# Patient Record
Sex: MALE | Race: WHITE | Employment: UNEMPLOYED | URBAN - METROPOLITAN AREA
[De-identification: names, ages, dates, MRNs, and addresses within clinical notes are randomized per-mention and may not be internally consistent; named-entity substitution may affect disease eponyms.]

---

## 2024-03-05 ENCOUNTER — PREP FOR PROCEDURE (OUTPATIENT)
Dept: SURGERY | Age: 44
End: 2024-03-05

## 2024-03-07 RX ORDER — SODIUM CHLORIDE 9 MG/ML
INJECTION, SOLUTION INTRAVENOUS PRN
Status: CANCELLED | OUTPATIENT
Start: 2024-03-07

## 2024-03-07 RX ORDER — SODIUM CHLORIDE 0.9 % (FLUSH) 0.9 %
5-40 SYRINGE (ML) INJECTION PRN
Status: CANCELLED | OUTPATIENT
Start: 2024-03-07

## 2024-03-07 RX ORDER — SODIUM CHLORIDE 0.9 % (FLUSH) 0.9 %
5-40 SYRINGE (ML) INJECTION EVERY 12 HOURS SCHEDULED
Status: CANCELLED | OUTPATIENT
Start: 2024-03-07

## 2024-03-19 NOTE — PROGRESS NOTES
PLEASE CONTINUE TAKING ALL PRESCRIPTION MEDICATIONS UP TO THE DAY OF SURGERY UNLESS OTHERWISE DIRECTED BELOW. You may take Tylenol, allergy,  and/or indigestion medications.     TAKE ONLY THESE MEDICATIONS ON THE DAY OF SURGERY    FLOMAX, DOXCYCLINE           DISCONTINUE all vitamins and supplements 7 days prior to surgery. DISCONTINUE Non-Steroidal Anti-Inflammatory (NSAIDS) such as Advil and Aleve 5 days prior to surgery.     Home Medications to Hold- please continue all other medications except these.    CIALIS--    MARIJUANA -- LAST USED 3/18/24     Comments      On the day before surgery please take 2 Tylenol in the morning and then again before bed. You may use either regular or extra strength.           Please do not bring home medications with you on the day of surgery unless otherwise directed by your nurse.  If you are instructed to bring home medications, please give them to your nurse as they will be administered by the nursing staff.    If you have any questions, please call [facility] [phone].    A copy of this note was provided to the patient for reference.

## 2024-03-19 NOTE — PROGRESS NOTES
Patient verified name and     Order for consent WAS NOT found in EHR and matches case posting; patient verified.     Type 1B surgery, PHONE assessment complete.    Labs per surgeon: NONE  Labs per anesthesia protocol: NONE  EKG: NONE      Hospital approved surgical skin cleanser and instructions given per hospital policy.    Patient provided with and instructed on educational handouts including Guide to Surgery, Pain Management, Hand Hygiene, Blood Transfusion Education, and [facility] Brochure.    Patient answered medical/surgical history questions at their best of ability. All prior to admission medications documented in [facility]. Original medication prescription bottle WAS NOT visualized during patient appointment.     Patient instructed to hold all vitamins 7 days prior to surgery and NSAIDS 5 days prior to surgery, patient verbalized understanding.     Patient teach back successful and patient demonstrates knowledge of instructions.

## 2024-03-24 ENCOUNTER — ANESTHESIA EVENT (OUTPATIENT)
Dept: SURGERY | Age: 44
End: 2024-03-24
Payer: MEDICAID

## 2024-03-25 ENCOUNTER — ANESTHESIA (OUTPATIENT)
Dept: SURGERY | Age: 44
End: 2024-03-25
Payer: MEDICAID

## 2024-03-25 ENCOUNTER — HOSPITAL ENCOUNTER (OUTPATIENT)
Age: 44
Setting detail: OUTPATIENT SURGERY
Discharge: HOME | End: 2024-03-25
Attending: SURGERY | Admitting: SURGERY
Payer: MEDICAID

## 2024-03-25 VITALS
DIASTOLIC BLOOD PRESSURE: 67 MMHG | HEIGHT: 70 IN | OXYGEN SATURATION: 95 % | SYSTOLIC BLOOD PRESSURE: 112 MMHG | BODY MASS INDEX: 42.37 KG/M2 | RESPIRATION RATE: 14 BRPM | TEMPERATURE: 97.7 F | HEART RATE: 67 BPM | WEIGHT: 296 LBS

## 2024-03-25 DIAGNOSIS — K64.4 SKIN TAGS, ANUS OR RECTUM: Primary | ICD-10-CM

## 2024-03-25 PROCEDURE — 2709999900 HC NON-CHARGEABLE SUPPLY: Performed by: SURGERY

## 2024-03-25 PROCEDURE — 6370000000 HC RX 637 (ALT 250 FOR IP): Performed by: ANESTHESIOLOGY

## 2024-03-25 PROCEDURE — 2580000003 HC RX 258: Performed by: ANESTHESIOLOGY

## 2024-03-25 PROCEDURE — 6360000002 HC RX W HCPCS: Performed by: SURGERY

## 2024-03-25 PROCEDURE — 6360000002 HC RX W HCPCS: Performed by: REGISTERED NURSE

## 2024-03-25 PROCEDURE — 2500000003 HC RX 250 WO HCPCS: Performed by: REGISTERED NURSE

## 2024-03-25 PROCEDURE — 3700000001 HC ADD 15 MINUTES (ANESTHESIA): Performed by: SURGERY

## 2024-03-25 PROCEDURE — 7100000001 HC PACU RECOVERY - ADDTL 15 MIN: Performed by: SURGERY

## 2024-03-25 PROCEDURE — 7100000011 HC PHASE II RECOVERY - ADDTL 15 MIN: Performed by: SURGERY

## 2024-03-25 PROCEDURE — 7100000000 HC PACU RECOVERY - FIRST 15 MIN: Performed by: SURGERY

## 2024-03-25 PROCEDURE — 3600000002 HC SURGERY LEVEL 2 BASE: Performed by: SURGERY

## 2024-03-25 PROCEDURE — 7100000010 HC PHASE II RECOVERY - FIRST 15 MIN: Performed by: SURGERY

## 2024-03-25 PROCEDURE — 3700000000 HC ANESTHESIA ATTENDED CARE: Performed by: SURGERY

## 2024-03-25 PROCEDURE — 6370000000 HC RX 637 (ALT 250 FOR IP): Performed by: SURGERY

## 2024-03-25 PROCEDURE — 3600000012 HC SURGERY LEVEL 2 ADDTL 15MIN: Performed by: SURGERY

## 2024-03-25 PROCEDURE — 2500000003 HC RX 250 WO HCPCS: Performed by: SURGERY

## 2024-03-25 RX ORDER — SODIUM CHLORIDE 0.9 % (FLUSH) 0.9 %
5-40 SYRINGE (ML) INJECTION PRN
Status: DISCONTINUED | OUTPATIENT
Start: 2024-03-25 | End: 2024-03-25 | Stop reason: HOSPADM

## 2024-03-25 RX ORDER — SODIUM CHLORIDE 0.9 % (FLUSH) 0.9 %
5-40 SYRINGE (ML) INJECTION EVERY 12 HOURS SCHEDULED
Status: DISCONTINUED | OUTPATIENT
Start: 2024-03-25 | End: 2024-03-25 | Stop reason: HOSPADM

## 2024-03-25 RX ORDER — GINSENG 100 MG
CAPSULE ORAL PRN
Status: DISCONTINUED | OUTPATIENT
Start: 2024-03-25 | End: 2024-03-25 | Stop reason: HOSPADM

## 2024-03-25 RX ORDER — DEXTROSE MONOHYDRATE 100 MG/ML
INJECTION, SOLUTION INTRAVENOUS CONTINUOUS PRN
Status: DISCONTINUED | OUTPATIENT
Start: 2024-03-25 | End: 2024-03-25 | Stop reason: HOSPADM

## 2024-03-25 RX ORDER — SODIUM CHLORIDE, SODIUM LACTATE, POTASSIUM CHLORIDE, CALCIUM CHLORIDE 600; 310; 30; 20 MG/100ML; MG/100ML; MG/100ML; MG/100ML
INJECTION, SOLUTION INTRAVENOUS CONTINUOUS
Status: DISCONTINUED | OUTPATIENT
Start: 2024-03-25 | End: 2024-03-25 | Stop reason: HOSPADM

## 2024-03-25 RX ORDER — DEXAMETHASONE SODIUM PHOSPHATE 4 MG/ML
INJECTION, SOLUTION INTRA-ARTICULAR; INTRALESIONAL; INTRAMUSCULAR; INTRAVENOUS; SOFT TISSUE PRN
Status: DISCONTINUED | OUTPATIENT
Start: 2024-03-25 | End: 2024-03-25 | Stop reason: SDUPTHER

## 2024-03-25 RX ORDER — EPHEDRINE SULFATE 5 MG/ML
INJECTION INTRAVENOUS PRN
Status: DISCONTINUED | OUTPATIENT
Start: 2024-03-25 | End: 2024-03-25 | Stop reason: SDUPTHER

## 2024-03-25 RX ORDER — SODIUM CHLORIDE 9 MG/ML
INJECTION, SOLUTION INTRAVENOUS PRN
Status: DISCONTINUED | OUTPATIENT
Start: 2024-03-25 | End: 2024-03-25 | Stop reason: HOSPADM

## 2024-03-25 RX ORDER — MIDAZOLAM HYDROCHLORIDE 2 MG/2ML
2 INJECTION, SOLUTION INTRAMUSCULAR; INTRAVENOUS
Status: DISCONTINUED | OUTPATIENT
Start: 2024-03-25 | End: 2024-03-25 | Stop reason: HOSPADM

## 2024-03-25 RX ORDER — NEOSTIGMINE METHYLSULFATE 1 MG/ML
INJECTION, SOLUTION INTRAVENOUS PRN
Status: DISCONTINUED | OUTPATIENT
Start: 2024-03-25 | End: 2024-03-25 | Stop reason: SDUPTHER

## 2024-03-25 RX ORDER — GLUCAGON 1 MG/ML
1 KIT INJECTION PRN
Status: DISCONTINUED | OUTPATIENT
Start: 2024-03-25 | End: 2024-03-25 | Stop reason: HOSPADM

## 2024-03-25 RX ORDER — ACETAMINOPHEN 500 MG
1000 TABLET ORAL ONCE
Status: COMPLETED | OUTPATIENT
Start: 2024-03-25 | End: 2024-03-25

## 2024-03-25 RX ORDER — PROCHLORPERAZINE EDISYLATE 5 MG/ML
5 INJECTION INTRAMUSCULAR; INTRAVENOUS
Status: DISCONTINUED | OUTPATIENT
Start: 2024-03-25 | End: 2024-03-25 | Stop reason: HOSPADM

## 2024-03-25 RX ORDER — HYDROMORPHONE HYDROCHLORIDE 2 MG/ML
0.5 INJECTION, SOLUTION INTRAMUSCULAR; INTRAVENOUS; SUBCUTANEOUS EVERY 10 MIN PRN
Status: DISCONTINUED | OUTPATIENT
Start: 2024-03-25 | End: 2024-03-25 | Stop reason: HOSPADM

## 2024-03-25 RX ORDER — ONDANSETRON 2 MG/ML
INJECTION INTRAMUSCULAR; INTRAVENOUS PRN
Status: DISCONTINUED | OUTPATIENT
Start: 2024-03-25 | End: 2024-03-25 | Stop reason: SDUPTHER

## 2024-03-25 RX ORDER — OXYCODONE HYDROCHLORIDE AND ACETAMINOPHEN 5; 325 MG/1; MG/1
1 TABLET ORAL EVERY 6 HOURS PRN
Qty: 20 TABLET | Refills: 0 | Status: SHIPPED | OUTPATIENT
Start: 2024-03-25 | End: 2024-03-30

## 2024-03-25 RX ORDER — KETOROLAC TROMETHAMINE 30 MG/ML
INJECTION, SOLUTION INTRAMUSCULAR; INTRAVENOUS PRN
Status: DISCONTINUED | OUTPATIENT
Start: 2024-03-25 | End: 2024-03-25 | Stop reason: SDUPTHER

## 2024-03-25 RX ORDER — SUCCINYLCHOLINE/SOD CL,ISO/PF 200MG/10ML
SYRINGE (ML) INTRAVENOUS PRN
Status: DISCONTINUED | OUTPATIENT
Start: 2024-03-25 | End: 2024-03-25 | Stop reason: SDUPTHER

## 2024-03-25 RX ORDER — OXYCODONE HYDROCHLORIDE 5 MG/1
5 TABLET ORAL
Status: DISCONTINUED | OUTPATIENT
Start: 2024-03-25 | End: 2024-03-25 | Stop reason: HOSPADM

## 2024-03-25 RX ORDER — LIDOCAINE HYDROCHLORIDE 10 MG/ML
1 INJECTION, SOLUTION INFILTRATION; PERINEURAL
Status: DISCONTINUED | OUTPATIENT
Start: 2024-03-25 | End: 2024-03-25 | Stop reason: HOSPADM

## 2024-03-25 RX ORDER — LIDOCAINE HYDROCHLORIDE 20 MG/ML
INJECTION, SOLUTION EPIDURAL; INFILTRATION; INTRACAUDAL; PERINEURAL PRN
Status: DISCONTINUED | OUTPATIENT
Start: 2024-03-25 | End: 2024-03-25 | Stop reason: SDUPTHER

## 2024-03-25 RX ORDER — ROCURONIUM BROMIDE 10 MG/ML
INJECTION, SOLUTION INTRAVENOUS PRN
Status: DISCONTINUED | OUTPATIENT
Start: 2024-03-25 | End: 2024-03-25 | Stop reason: SDUPTHER

## 2024-03-25 RX ORDER — NALOXONE HYDROCHLORIDE 0.4 MG/ML
INJECTION, SOLUTION INTRAMUSCULAR; INTRAVENOUS; SUBCUTANEOUS PRN
Status: DISCONTINUED | OUTPATIENT
Start: 2024-03-25 | End: 2024-03-25 | Stop reason: HOSPADM

## 2024-03-25 RX ORDER — GLYCOPYRROLATE 0.2 MG/ML
INJECTION INTRAMUSCULAR; INTRAVENOUS PRN
Status: DISCONTINUED | OUTPATIENT
Start: 2024-03-25 | End: 2024-03-25 | Stop reason: SDUPTHER

## 2024-03-25 RX ORDER — PROPOFOL 10 MG/ML
INJECTION, EMULSION INTRAVENOUS PRN
Status: DISCONTINUED | OUTPATIENT
Start: 2024-03-25 | End: 2024-03-25 | Stop reason: SDUPTHER

## 2024-03-25 RX ORDER — BUPIVACAINE HYDROCHLORIDE AND EPINEPHRINE 5; 5 MG/ML; UG/ML
INJECTION, SOLUTION EPIDURAL; INTRACAUDAL; PERINEURAL PRN
Status: DISCONTINUED | OUTPATIENT
Start: 2024-03-25 | End: 2024-03-25 | Stop reason: HOSPADM

## 2024-03-25 RX ORDER — DIPHENHYDRAMINE HYDROCHLORIDE 50 MG/ML
12.5 INJECTION INTRAMUSCULAR; INTRAVENOUS
Status: DISCONTINUED | OUTPATIENT
Start: 2024-03-25 | End: 2024-03-25 | Stop reason: HOSPADM

## 2024-03-25 RX ORDER — FENTANYL CITRATE 50 UG/ML
100 INJECTION, SOLUTION INTRAMUSCULAR; INTRAVENOUS
Status: DISCONTINUED | OUTPATIENT
Start: 2024-03-25 | End: 2024-03-25 | Stop reason: HOSPADM

## 2024-03-25 RX ADMIN — GLYCOPYRROLATE 0.8 MG: 0.2 INJECTION INTRAMUSCULAR; INTRAVENOUS at 08:09

## 2024-03-25 RX ADMIN — SODIUM CHLORIDE, SODIUM LACTATE, POTASSIUM CHLORIDE, AND CALCIUM CHLORIDE: 600; 310; 30; 20 INJECTION, SOLUTION INTRAVENOUS at 07:01

## 2024-03-25 RX ADMIN — FENTANYL CITRATE 100 MCG: 50 INJECTION INTRAMUSCULAR; INTRAVENOUS at 07:14

## 2024-03-25 RX ADMIN — Medication 200 MG: at 07:14

## 2024-03-25 RX ADMIN — ACETAMINOPHEN 1000 MG: 500 TABLET, FILM COATED ORAL at 07:00

## 2024-03-25 RX ADMIN — Medication 3000 MG: at 07:29

## 2024-03-25 RX ADMIN — PROPOFOL 300 MG: 10 INJECTION, EMULSION INTRAVENOUS at 07:14

## 2024-03-25 RX ADMIN — ROCURONIUM BROMIDE 30 MG: 10 INJECTION, SOLUTION INTRAVENOUS at 07:42

## 2024-03-25 RX ADMIN — LIDOCAINE HYDROCHLORIDE 100 MG: 20 INJECTION, SOLUTION EPIDURAL; INFILTRATION; INTRACAUDAL; PERINEURAL at 07:14

## 2024-03-25 RX ADMIN — PROPOFOL 100 MG: 10 INJECTION, EMULSION INTRAVENOUS at 07:41

## 2024-03-25 RX ADMIN — KETOROLAC TROMETHAMINE 30 MG: 30 INJECTION, SOLUTION INTRAMUSCULAR at 08:00

## 2024-03-25 RX ADMIN — SODIUM CHLORIDE, SODIUM LACTATE, POTASSIUM CHLORIDE, AND CALCIUM CHLORIDE: 600; 310; 30; 20 INJECTION, SOLUTION INTRAVENOUS at 08:09

## 2024-03-25 RX ADMIN — ONDANSETRON 4 MG: 2 INJECTION INTRAMUSCULAR; INTRAVENOUS at 07:49

## 2024-03-25 RX ADMIN — DEXAMETHASONE SODIUM PHOSPHATE 4 MG: 4 INJECTION INTRA-ARTICULAR; INTRALESIONAL; INTRAMUSCULAR; INTRAVENOUS; SOFT TISSUE at 07:29

## 2024-03-25 RX ADMIN — Medication 5 MG: at 08:09

## 2024-03-25 RX ADMIN — FENTANYL CITRATE 50 MCG: 50 INJECTION INTRAMUSCULAR; INTRAVENOUS at 07:42

## 2024-03-25 RX ADMIN — EPHEDRINE SULFATE 10 MG: 5 INJECTION INTRAVENOUS at 07:33

## 2024-03-25 ASSESSMENT — PAIN - FUNCTIONAL ASSESSMENT
PAIN_FUNCTIONAL_ASSESSMENT: NONE - DENIES PAIN
PAIN_FUNCTIONAL_ASSESSMENT: 0-10

## 2024-03-25 ASSESSMENT — LIFESTYLE VARIABLES: SMOKING_STATUS: 1

## 2024-03-25 NOTE — OP NOTE
Operative Note      Patient: Ricardo Rooney III  YOB: 1980  MRN: [id number]    Date of Procedure: 3/25/2024    Pre-Op Diagnosis Codes:     * Skin tags, anus or rectum [K64.4]    Post-Op Diagnosis: Same       Procedure(s):  EXCISION OF ANAL TAGS    Surgeon(s):  Santana Valdovinos Jr., MD    Assistant:   Surgical Assistant: Adriana Maria    Anesthesia: General    Estimated Blood Loss (mL): Minimal    Complications: None    Specimens:   * No specimens in log *    Implants:  * No implants in log *      Drains: * No LDAs found *    Findings: 4 anal tag    This procedure was not performed to treat primary cutaneous melanoma through wide local excision      Detailed Description of Procedure:   Dictated   jOB#[id number]    Electronically signed by SANTANA VALDOVINOS JR, MD on 3/25/2024 at 8:08 AM

## 2024-03-25 NOTE — OP NOTE
[address]  [address]                            OPERATIVE REPORT      PATIENT NAME: ALBERTO HERNANDEZ      : 1980  MED REC NO: [id number]                       ROOM: [facility]  ACCOUNT NO: [id number]                       ADMIT DATE: 2024  PROVIDER: Santana Valdovinos Jr, MD    DATE OF SERVICE:  2024    PREOPERATIVE DIAGNOSES:  Anal tags.    POSTOPERATIVE DIAGNOSES:  Anal tags.    PROCEDURES PERFORMED:  Excision of anal tags.    SURGEON:  Santana Valdovinos Jr, MD    ASSISTANT:  None.    ANESTHESIA:  General.    ESTIMATED BLOOD LOSS:  Minimal.    SPECIMENS REMOVED:  Four anal tags.         COMPLICATIONS:  None.    IMPLANTS:  none        DESCRIPTION OF PROCEDURE:  After the patient was brought into the room, he was placed under general anesthesia.  He was then placed in the prone position.  The anus and buttocks were prepped and draped in sterile fashion.  The patient had 4 relatively large groups of anal tags.  These were cross-clamped with hemostats.  After leaving this for several minutes, the hemostats were removed one at a time and using the Harmonic, these were excised.  Each one of these areas was oversewn with a running 2-0 Vicryl.  All of these were sent to Pathology.  Minimal-to-no bleeding.  Some bacitracin ointment was then placed on the area.  The patient tolerated the procedure well.    COUNTS:  Correct.        SANTANA VALDOVINOS JR, MD      TCM/AQS  D:  2024 08:10:56  T:  2024 09:41:58  JOB #:  [id number]

## 2024-03-25 NOTE — ANESTHESIA POSTPROCEDURE EVALUATION
Department of Anesthesiology  Postprocedure Note    Patient: Ricardo Rooney III  MRN: [id number]  YOB: 1980  Date of evaluation: 3/25/2024    Procedure Summary       Date: 03/25/24 Room / Location: [facility] MAIN OR 03 / [facility] MAIN OR    Anesthesia Start: 0705 Anesthesia Stop: 0821    Procedure: EXCISION OF ANAL TAGS (Anus) Diagnosis:       Skin tags, anus or rectum      (Skin tags, anus or rectum [K64.4])    Providers: Santana Bianchi Jr., MD Responsible Provider: Heber Giles IV, MD    Anesthesia Type: general ASA Status: 3            Anesthesia Type: No value filed.    Clay Phase I: Clay Score: 10    Clay Phase II: Clay Score: 10    Anesthesia Post Evaluation    Patient location during evaluation: PACU  Patient participation: complete - patient participated  Level of consciousness: awake  Airway patency: patent  Nausea & Vomiting: no nausea and no vomiting  Cardiovascular status: hemodynamically stable  Respiratory status: acceptable, nonlabored ventilation and spontaneous ventilation  Hydration status: euvolemic  Comments: /67   Pulse 67   Temp 97.7 °F (36.5 °C) (Skin)   Resp 14   Ht 1.778 m (5' 10\")   Wt 134.3 kg (296 lb)   SpO2 95%   BMI 42.47 kg/m²     Multimodal analgesia pain management approach  Pain management: adequate and satisfactory to patient        No notable events documented.

## 2024-03-25 NOTE — BRIEF OP NOTE
Brief Postoperative Note      Patient: Ricardo Rooney III  YOB: 1980  MRN: [id number]    Date of Procedure: 3/25/2024    Pre-Op Diagnosis Codes:     * Skin tags, anus or rectum [K64.4]    Post-Op Diagnosis: Same       Procedure(s):  EXCISION OF ANAL TAGS    Surgeon(s):  Santana Valdovinos Jr., MD    Assistant:  Surgical Assistant: Adriana Maria    Anesthesia: General    Estimated Blood Loss (mL): Minimal    Complications: None    Specimens:   * No specimens in log *    Implants:  * No implants in log *      Drains: * No LDAs found *    Findings: 4 anal tags  This procedure was not performed to treat primary cutaneous melanoma through wide local excision      Electronically signed by SANTANA VALDOVINOS JR, MD on 3/25/2024 at 8:08 AM

## 2024-03-25 NOTE — DISCHARGE INSTRUCTIONS
Daily shower with antibacterial soap. Pat dry.  Wash perineal area after each bowel movement  Do not get constipated, use stool softeners and increase fiber in diet.    After general anesthesia or intravenous sedation, for 24 hours or while taking prescription Narcotics:  Limit your activities  Some people will feel drowsy or dizzy for up to a few hours after waking up.  A responsible adult needs to be with you for the next 24 hours  Do not drive and operate hazardous machinery  Do not make important personal or business decisions  Do not drink alcoholic beverages  If you have not urinated within 8 hours after discharge, and you are experiencing discomfort from urinary retention, please go to the nearest ED.  If you have sleep apnea and have a CPAP machine, please use it for all naps and sleeping.  Please use caution when taking narcotics and any of your home medications that may cause drowsiness.  *  Please give a list of your current medications to your Primary Care Provider.  *  Please update this list whenever your medications are discontinued, doses are      changed, or new medications (including over-the-counter products) are added.  *  Please carry medication information at all times in case of emergency situations.    These are general instructions for a healthy lifestyle:  No smoking/ No tobacco products/ Avoid exposure to second hand smoke  Surgeon General's Warning:  Quitting smoking now greatly reduces serious risk to your health.  Obesity, smoking, and sedentary lifestyle greatly increases your risk for illness  A healthy diet, regular physical exercise & weight monitoring are important for maintaining a healthy lifestyle    You may be retaining fluid if you have a history of heart failure or if you experience any of the following symptoms:  Weight gain of 3 pounds or more overnight or 5 pounds in a week, increased swelling in our hands or feet or shortness of breath while lying flat in bed.  Please

## 2024-03-25 NOTE — ANESTHESIA PRE PROCEDURE
Department of Anesthesiology  Preprocedure Note       Name:  Ricardo Rooney III   Age:  44 y.o.  :  1980                                          MRN:  [id number]         Date:  3/25/2024      Surgeon: Surgeon(s):  Santana Bianchi Jr., MD    Procedure: Procedure(s):  EXCISION OF ANAL TAGS    Medications prior to admission:   Prior to Admission medications    Medication Sig Start Date End Date Taking? Authorizing Provider   doxycycline monohydrate (MONODOX) 50 MG capsule TAKE 1 CAPSULE BY MOUTH DAILY WITH MEALS 24   ProviderRaulito MD   tamsulosin (FLOMAX) 0.4 MG capsule Take 1 capsule by mouth daily    Raulito Walker MD   tadalafil (CIALIS) 20 MG tablet Take 1 tablet by mouth as needed for Erectile Dysfunction    ProviderRaulito MD       Current medications:    Current Facility-Administered Medications   Medication Dose Route Frequency Provider Last Rate Last Admin   • lidocaine 1 % injection 1 mL  1 mL IntraDERmal Once PRN Anselmo Arevalo MD       • acetaminophen (TYLENOL) tablet 1,000 mg  1,000 mg Oral Once Anselmo Arevalo MD       • fentaNYL (SUBLIMAZE) injection 100 mcg  100 mcg IntraVENous Once PRN Anselmo Arevalo MD       • lactated ringers IV soln infusion   IntraVENous Continuous Anselmo Arevalo MD       • sodium chloride flush 0.9 % injection 5-40 mL  5-40 mL IntraVENous 2 times per day Anselmo Arevalo MD       • sodium chloride flush 0.9 % injection 5-40 mL  5-40 mL IntraVENous PRN Anselmo Arevalo MD       • 0.9 % sodium chloride infusion   IntraVENous PRN Anselmo Arevalo MD       • midazolam PF (VERSED) injection 2 mg  2 mg IntraVENous Once PRN Anselmo Arevalo MD       • ceFAZolin (ANCEF) 3000 mg in sterile water 30 mL IV syringe  3,000 mg IntraVENous Once Santana Bianchi Jr., MD       • sodium chloride flush 0.9 % injection 5-40 mL  5-40 mL IntraVENous 2 times per day Santana Bianchi Jr., MD       • sodium chloride flush 0.9 % injection 5-40 mL  5-40 mL IntraVENous PRN Arias

## 2024-04-02 ENCOUNTER — OFFICE VISIT (OUTPATIENT)
Dept: SURGERY | Age: 44
End: 2024-04-02

## 2024-04-02 VITALS — BODY MASS INDEX: 42.37 KG/M2 | HEIGHT: 70 IN | WEIGHT: 296 LBS

## 2024-04-02 DIAGNOSIS — Z09 POSTOPERATIVE EXAMINATION: ICD-10-CM

## 2024-04-02 DIAGNOSIS — K64.4 SKIN TAGS, ANUS OR RECTUM: Primary | ICD-10-CM

## 2024-04-02 PROCEDURE — 99024 POSTOP FOLLOW-UP VISIT: CPT

## 2024-04-02 NOTE — PROGRESS NOTES
[address]   [facility] [id number]  [phone]        poDate: 2024      Name: Ricardo Rooney III      MRN: [id number]       : 1980       Age: 44 y.o.    Sex: male        Unknown, Provider, APRN - NP       CC:    Chief Complaint   Patient presents with    Post-Op Check       HPI:  The patient presents for a post-op visit s/p  Excision of anal tags. Santana Bianchi Jr, MD 3/25/24    Physical Exam:     Ht 1.778 m (5' 10\")   Wt 134.3 kg (296 lb)   BMI 42.47 kg/m²     General: Alert, oriented, cooperative and in no acute distress.     Neck: Supple, trachea midline, no appreciable thyromegaly  Resp: No JVD.  Breathing is  non-labored. Lungs clear to auscultation without wheezing or rhonchi   CV: RRR. No murmurs, rubs or gallops appreciated.  Abd: soft non-tender and non-distended without peritoneal signs. +bs    Skin/incision:  anal tissue is intact with scant serous drainage. No signs of infection. No erythema.    Assessment/Plan:  Ricardo Rooney III is a 44 y.o. male who is s/p Excision of anal tags. Santana Bianchi Jr, MD 3/25/24.    1. Follow-up as needed    2. Lift nothing heavier than 25 pounds for 4 weeks after surgery. 4 weeks after Surgery, return to full activity    3. Regular diet  4. Do not get constipated. No more than 1 day without a bm. If 1 day no bm, then take colace stool softener twice a day. If 24 hours of taking colace stool softener does not produce a bm , then keep taking colace and add miralax laxative twice a day until you have a bm. Once you are having regular bms, you can use colace and/or miralax as needed to ensure you have a regular daily bm.   5. Gently wash the incision with antibacterial soap and pat dry at least once daily and after bowel movements.    Signed: SKIP Foster - LORENA    2024  2:54 PM

## 2024-04-02 NOTE — PATIENT INSTRUCTIONS
1. Follow-up as needed    2. Lift nothing heavier than 25 pounds for 4 weeks after surgery. 4 weeks after Surgery, return to full activity    3. Regular diet  4. Do not get constipated. No more than 1 day without a bm. If 1 day no bm, then take colace stool softener twice a day. If 24 hours of taking colace stool softener does not produce a bm , then keep taking colace and add miralax laxative twice a day until you have a bm. Once you are having regular bms, you can use colace and/or miralax as needed to ensure you have a regular daily bm.   5. Gently wash the incision with antibacterial soap and pat dry at least once daily and after bowel movements.

## 2024-04-16 ENCOUNTER — OFFICE VISIT (OUTPATIENT)
Dept: SURGERY | Age: 44
End: 2024-04-16

## 2024-04-16 VITALS — BODY MASS INDEX: 42.37 KG/M2 | HEIGHT: 70 IN | WEIGHT: 296 LBS

## 2024-04-16 DIAGNOSIS — Z09 POSTOPERATIVE EXAMINATION: Primary | ICD-10-CM

## 2024-04-16 PROCEDURE — 99024 POSTOP FOLLOW-UP VISIT: CPT

## 2024-04-16 NOTE — PATIENT INSTRUCTIONS
1. Follow-up as needed    2.         Lift nothing heavier than 25 pounds for 4 weeks after surgery. 4 weeks after Surgery, return to full activity     3.         Regular diet  4.         Do not get constipated. No more than 1 day without a bm. If 1 day no bm, then take colace stool softener twice a day. If 24 hours of taking colace stool softener does not produce a bm , then keep taking colace and add miralax laxative twice a day until you have a bm. Once you are having regular bms, you can use colace and/or miralax as needed to ensure you have a regular daily bm.   5.         Gently wash the incision with antibacterial soap and pat dry at least once daily and after bowel movements.  6. Follow up with Dr. Bianchi 5/2/24

## 2024-04-16 NOTE — PROGRESS NOTES
after bowel movements.  6. Follow up with Dr. Bianchi 5/2/24    Signed: SKIP Foster NP    4/16/2024  4:02 PM

## 2024-05-02 ENCOUNTER — OFFICE VISIT (OUTPATIENT)
Dept: SURGERY | Age: 44
End: 2024-05-02
Payer: MEDICAID

## 2024-05-02 VITALS
BODY MASS INDEX: 42.37 KG/M2 | WEIGHT: 296 LBS | DIASTOLIC BLOOD PRESSURE: 78 MMHG | HEIGHT: 70 IN | HEART RATE: 66 BPM | SYSTOLIC BLOOD PRESSURE: 121 MMHG

## 2024-05-02 DIAGNOSIS — R19.00 FULLNESS OF INGUINAL REGION: Primary | ICD-10-CM

## 2024-05-02 PROCEDURE — 99213 OFFICE O/P EST LOW 20 MIN: CPT | Performed by: SURGERY

## 2024-05-02 NOTE — PROGRESS NOTES
[facility]  [address]  [place], [place] [address] [phone]    Office Note/H&P/Consult Note   Ricardo Rooney III   MRN: [id number]     : 1980        HPI: Ricardo Rooney III is a 44 y.o. male who is here to see me today with the possibility that he has bilateral inguinal hernias.  He had an open right inguinal hernia repair on 2015 at [facility] with a pro light plug and mesh.  He is feeling some discomfort on both groin areas and would like to be checked.        Past Medical History:   Diagnosis Date    Anxiety and depression     hx-- no meds at present    Back pain     Leg pain     from sciatica    Morbid obesity (HCC)     BMI = 42    Plantar fasciitis     Sciatica     bilat    Skin tags, anus or rectum 2024    Excision of anal tags. Santana Bianchi Jr, MD 3/25/24     Past Surgical History:   Procedure Laterality Date    ANUS SURGERY N/A 3/25/2024    EXCISION OF ANAL TAGS performed by Santana Bianchi Jr., MD at [facility] MAIN OR    HERNIA REPAIR Right     ORTHOPEDIC SURGERY Left     Compound fracture left elbow; hardware in place     Current Outpatient Medications   Medication Sig    doxycycline monohydrate (MONODOX) 50 MG capsule TAKE 1 CAPSULE BY MOUTH DAILY WITH MEALS    tamsulosin (FLOMAX) 0.4 MG capsule Take 1 capsule by mouth daily    tadalafil (CIALIS) 20 MG tablet Take 1 tablet by mouth as needed for Erectile Dysfunction     No current facility-administered medications for this visit.     ALLERGIES:  Latex, Meperidine, and Penicillins    Social History     Socioeconomic History    Marital status: Single     Spouse name: None    Number of children: None    Years of education: None    Highest education level: None   Tobacco Use    Smoking status: Every Day     Current packs/day: 0.50     Types: Cigarettes    Smokeless tobacco: Never    Tobacco comments:     Smokes 0.5 ppd x 15 yrs   Vaping Use    Vaping Use: Never used   Substance and

## 2024-05-30 ENCOUNTER — OFFICE VISIT (OUTPATIENT)
Dept: UROLOGY | Age: 44
End: 2024-05-30
Payer: MEDICAID

## 2024-05-30 DIAGNOSIS — N13.8 BPH WITH OBSTRUCTION/LOWER URINARY TRACT SYMPTOMS: ICD-10-CM

## 2024-05-30 DIAGNOSIS — N40.1 BPH WITH OBSTRUCTION/LOWER URINARY TRACT SYMPTOMS: ICD-10-CM

## 2024-05-30 DIAGNOSIS — N39.43 POST-VOID DRIBBLING: Primary | ICD-10-CM

## 2024-05-30 LAB
BILIRUBIN, URINE, POC: NEGATIVE
BLOOD URINE, POC: NEGATIVE
GLUCOSE URINE, POC: NEGATIVE
KETONES, URINE, POC: NEGATIVE
LEUKOCYTE ESTERASE, URINE, POC: NEGATIVE
NITRITE, URINE, POC: NEGATIVE
PH, URINE, POC: 6 (ref 4.6–8)
PROTEIN,URINE, POC: NEGATIVE
SPECIFIC GRAVITY, URINE, POC: 1.01 (ref 1–1.03)
URINALYSIS CLARITY, POC: NORMAL
URINALYSIS COLOR, POC: NORMAL
UROBILINOGEN, POC: NORMAL

## 2024-05-30 PROCEDURE — 81003 URINALYSIS AUTO W/O SCOPE: CPT | Performed by: UROLOGY

## 2024-05-30 PROCEDURE — 99204 OFFICE O/P NEW MOD 45 MIN: CPT | Performed by: UROLOGY

## 2024-05-30 ASSESSMENT — ENCOUNTER SYMPTOMS
INDIGESTION: 0
NAUSEA: 0
SKIN LESIONS: 0
DIARRHEA: 0
EYE DISCHARGE: 0
EYE PAIN: 0
WHEEZING: 0
COUGH: 0
SHORTNESS OF BREATH: 0
BACK PAIN: 0
VOMITING: 0
HEARTBURN: 0
CONSTIPATION: 0
ABDOMINAL PAIN: 0
BLOOD IN STOOL: 0

## 2024-05-30 NOTE — PROGRESS NOTES
file   Occupational History    Not on file   Tobacco Use    Smoking status: Every Day     Current packs/day: 0.50     Average packs/day: 0.5 packs/day for 15.0 years (7.5 ttl pk-yrs)     Types: Cigarettes    Smokeless tobacco: Never    Tobacco comments:     Smokes 0.5 ppd x 15 yrs   Vaping Use    Vaping Use: Never used   Substance and Sexual Activity    Alcohol use: Yes     Comment: very rarely    Drug use: Not Currently     Types: Marijuana (Weed)     Comment: smokes daily-- none 3/18/24    Sexual activity: Yes     Partners: Male   Other Topics Concern    Not on file   Social History Narrative    Not on file     Social Determinants of Health     Financial Resource Strain: Not on file   Food Insecurity: Not on file   Transportation Needs: Not on file   Physical Activity: Not on file   Stress: Not on file   Social Connections: Not on file   Intimate Partner Violence: Not on file   Housing Stability: Not on file     Family History   Problem Relation Age of Onset    Lung Cancer Maternal Grandfather         smoker    Atrial Fibrillation Father     Other Maternal Grandmother         multiple myeloma    Kidney Disease Maternal Grandmother     Kidney Disease Father     Diabetes Paternal Grandmother     Stroke Paternal Grandmother     Heart Attack Paternal Grandfather     Colon Cancer Paternal Grandfather        Review of Systems  Constitutional:   Negative for fever, chills, appetite change, malaise/fatigue, headaches and weight loss.  Skin:  Negative for skin lesions, rash and itching.  Eyes:  Negative for visual disturbance, eye pain and eye discharge.  ENT:  Negative for difficulty articulating words, pain swallowing, high frequency hearing loss and dry mouth.  Respiratory:  Negative for cough, blood in sputum, shortness of breath and wheezing.  Cardiovascular:  Negative for chest pain, hypertension, irregular heartbeat, leg pain, leg swelling, regular rate and rhythm and varicose veins.  GI:  Negative for nausea,

## 2025-02-20 SDOH — HEALTH STABILITY: PHYSICAL HEALTH: ON AVERAGE, HOW MANY DAYS PER WEEK DO YOU ENGAGE IN MODERATE TO STRENUOUS EXERCISE (LIKE A BRISK WALK)?: 1 DAY

## 2025-02-20 SDOH — HEALTH STABILITY: PHYSICAL HEALTH: ON AVERAGE, HOW MANY MINUTES DO YOU ENGAGE IN EXERCISE AT THIS LEVEL?: 10 MIN

## 2025-02-21 ENCOUNTER — OFFICE VISIT (OUTPATIENT)
Dept: PRIMARY CARE CLINIC | Facility: CLINIC | Age: 45
End: 2025-02-21

## 2025-02-21 VITALS
SYSTOLIC BLOOD PRESSURE: 120 MMHG | DIASTOLIC BLOOD PRESSURE: 72 MMHG | WEIGHT: 300.6 LBS | HEIGHT: 70 IN | HEART RATE: 65 BPM | TEMPERATURE: 98.4 F | BODY MASS INDEX: 43.03 KG/M2 | OXYGEN SATURATION: 98 % | RESPIRATION RATE: 16 BRPM

## 2025-02-21 DIAGNOSIS — E78.2 MIXED HYPERLIPIDEMIA: ICD-10-CM

## 2025-02-21 DIAGNOSIS — F33.40 RECURRENT MAJOR DEPRESSIVE DISORDER, IN REMISSION: ICD-10-CM

## 2025-02-21 DIAGNOSIS — M51.27 HERNIATED NUCLEUS PULPOSUS, L5-S1, LEFT: ICD-10-CM

## 2025-02-21 DIAGNOSIS — Z13.1 SCREENING FOR DIABETES MELLITUS (DM): ICD-10-CM

## 2025-02-21 DIAGNOSIS — E34.9 TESTOSTERONE INSUFFICIENCY: ICD-10-CM

## 2025-02-21 DIAGNOSIS — Z12.11 SCREEN FOR COLON CANCER: ICD-10-CM

## 2025-02-21 DIAGNOSIS — Z11.59 ENCOUNTER FOR SCREENING FOR VIRAL DISEASE: ICD-10-CM

## 2025-02-21 DIAGNOSIS — R39.14 BENIGN PROSTATIC HYPERPLASIA WITH INCOMPLETE BLADDER EMPTYING: ICD-10-CM

## 2025-02-21 DIAGNOSIS — M51.26 HERNIATED NUCLEUS PULPOSUS, L4-5 RIGHT: ICD-10-CM

## 2025-02-21 DIAGNOSIS — M25.511 ACUTE PAIN OF RIGHT SHOULDER: ICD-10-CM

## 2025-02-21 DIAGNOSIS — Z00.00 ANNUAL PHYSICAL EXAM: Primary | ICD-10-CM

## 2025-02-21 DIAGNOSIS — N40.1 BENIGN PROSTATIC HYPERPLASIA WITH INCOMPLETE BLADDER EMPTYING: ICD-10-CM

## 2025-02-21 DIAGNOSIS — Z23 NEED FOR TDAP VACCINATION: ICD-10-CM

## 2025-02-21 DIAGNOSIS — Z13.21 ENCOUNTER FOR VITAMIN DEFICIENCY SCREENING: ICD-10-CM

## 2025-02-21 PROBLEM — F32.A ANXIETY AND DEPRESSION: Status: RESOLVED | Noted: 2020-02-27 | Resolved: 2025-02-21

## 2025-02-21 PROBLEM — F32.5 MAJOR DEPRESSIVE DISORDER IN REMISSION: Status: ACTIVE | Noted: 2024-03-28

## 2025-02-21 PROBLEM — J30.1 SEASONAL ALLERGIC RHINITIS DUE TO POLLEN: Status: ACTIVE | Noted: 2020-02-27

## 2025-02-21 PROBLEM — E66.01 SEVERE OBESITY: Status: RESOLVED | Noted: 2020-02-27 | Resolved: 2025-02-21

## 2025-02-21 PROBLEM — F41.9 ANXIETY AND DEPRESSION: Status: RESOLVED | Noted: 2020-02-27 | Resolved: 2025-02-21

## 2025-02-21 PROBLEM — R19.00: Status: RESOLVED | Noted: 2024-05-02 | Resolved: 2025-02-21

## 2025-02-21 PROBLEM — K40.90 INGUINAL HERNIA: Status: ACTIVE | Noted: 2024-04-23

## 2025-02-21 LAB
25(OH)D3 SERPL-MCNC: 16 NG/ML (ref 30–100)
ALBUMIN SERPL-MCNC: 3.7 G/DL (ref 3.5–5)
ALBUMIN/GLOB SERPL: 1.3 (ref 1–1.9)
ALP SERPL-CCNC: 135 U/L (ref 40–129)
ALT SERPL-CCNC: 22 U/L (ref 8–55)
ANION GAP SERPL CALC-SCNC: 9 MMOL/L (ref 7–16)
AST SERPL-CCNC: 16 U/L (ref 15–37)
BASOPHILS # BLD: 0.04 K/UL (ref 0–0.2)
BASOPHILS NFR BLD: 0.5 % (ref 0–2)
BILIRUB SERPL-MCNC: 0.2 MG/DL (ref 0–1.2)
BUN SERPL-MCNC: 10 MG/DL (ref 6–23)
CALCIUM SERPL-MCNC: 9.8 MG/DL (ref 8.8–10.2)
CHLORIDE SERPL-SCNC: 107 MMOL/L (ref 98–107)
CHOLEST SERPL-MCNC: 159 MG/DL (ref 0–200)
CO2 SERPL-SCNC: 26 MMOL/L (ref 20–29)
CREAT SERPL-MCNC: 0.94 MG/DL (ref 0.8–1.3)
DIFFERENTIAL METHOD BLD: NORMAL
EOSINOPHIL # BLD: 0.25 K/UL (ref 0–0.8)
EOSINOPHIL NFR BLD: 3 % (ref 0.5–7.8)
ERYTHROCYTE [DISTWIDTH] IN BLOOD BY AUTOMATED COUNT: 12.5 % (ref 11.9–14.6)
EST. AVERAGE GLUCOSE BLD GHB EST-MCNC: 113 MG/DL
GLOBULIN SER CALC-MCNC: 2.9 G/DL (ref 2.3–3.5)
GLUCOSE SERPL-MCNC: 98 MG/DL (ref 70–99)
HBA1C MFR BLD: 5.6 % (ref 0–5.6)
HBV SURFACE AB SERPL IA-ACNC: 497 MIU/ML
HCT VFR BLD AUTO: 47.2 % (ref 41.1–50.3)
HDLC SERPL-MCNC: 41 MG/DL (ref 40–60)
HDLC SERPL: 3.9 (ref 0–5)
HGB BLD-MCNC: 16.3 G/DL (ref 13.6–17.2)
IMM GRANULOCYTES # BLD AUTO: 0.01 K/UL (ref 0–0.5)
IMM GRANULOCYTES NFR BLD AUTO: 0.1 % (ref 0–5)
LDLC SERPL CALC-MCNC: 97 MG/DL (ref 0–100)
LYMPHOCYTES # BLD: 3.23 K/UL (ref 0.5–4.6)
LYMPHOCYTES NFR BLD: 38.2 % (ref 13–44)
MAGNESIUM SERPL-MCNC: 2.1 MG/DL (ref 1.8–2.4)
MCH RBC QN AUTO: 32.5 PG (ref 26.1–32.9)
MCHC RBC AUTO-ENTMCNC: 34.5 G/DL (ref 31.4–35)
MCV RBC AUTO: 94.2 FL (ref 82–102)
MONOCYTES # BLD: 0.91 K/UL (ref 0.1–1.3)
MONOCYTES NFR BLD: 10.8 % (ref 4–12)
NEUTS SEG # BLD: 4.02 K/UL (ref 1.7–8.2)
NEUTS SEG NFR BLD: 47.4 % (ref 43–78)
NRBC # BLD: 0 K/UL (ref 0–0.2)
PLATELET # BLD AUTO: 207 K/UL (ref 150–450)
PMV BLD AUTO: 11.2 FL (ref 9.4–12.3)
POTASSIUM SERPL-SCNC: 4.7 MMOL/L (ref 3.5–5.1)
PROT SERPL-MCNC: 6.6 G/DL (ref 6.3–8.2)
PSA SERPL-MCNC: 1.2 NG/ML (ref 0–4)
RBC # BLD AUTO: 5.01 M/UL (ref 4.23–5.6)
SODIUM SERPL-SCNC: 142 MMOL/L (ref 136–145)
TRIGL SERPL-MCNC: 104 MG/DL (ref 0–150)
TSH W FREE THYROID IF ABNORMAL: 1.02 UIU/ML (ref 0.27–4.2)
VIT B12 SERPL-MCNC: 369 PG/ML (ref 193–986)
VLDLC SERPL CALC-MCNC: 21 MG/DL (ref 6–23)
WBC # BLD AUTO: 8.5 K/UL (ref 4.3–11.1)

## 2025-02-21 RX ORDER — CETIRIZINE HYDROCHLORIDE 10 MG/1
1 TABLET ORAL DAILY
COMMUNITY

## 2025-02-21 ASSESSMENT — PATIENT HEALTH QUESTIONNAIRE - PHQ9
1. LITTLE INTEREST OR PLEASURE IN DOING THINGS: NOT AT ALL
SUM OF ALL RESPONSES TO PHQ QUESTIONS 1-9: 0
SUM OF ALL RESPONSES TO PHQ9 QUESTIONS 1 & 2: 0
2. FEELING DOWN, DEPRESSED OR HOPELESS: NOT AT ALL
SUM OF ALL RESPONSES TO PHQ QUESTIONS 1-9: 0

## 2025-02-21 NOTE — ASSESSMENT & PLAN NOTE
Worsening right shoulder pain with crepitus.  Will place referral to Ortho for possible imaging and/or workup.

## 2025-02-21 NOTE — ASSESSMENT & PLAN NOTE
Overall stable at this time.  Does engage in cannabis but does not take any medication for his depression does not desire medication at this time.  Bears close watching.  No SI or HI expressed.  Overall stable.

## 2025-02-21 NOTE — ASSESSMENT & PLAN NOTE
Historical diagnosis.  Not currently on testosterone replacement therapy will check T today.  May need referral back to urology for adjustment.

## 2025-02-21 NOTE — PROGRESS NOTES
[facility]  Antonio Wolff, MSN, APRN, FNP-c  [address] [address]  [phone]    New patient establishment and annual Physical     Date of Service: 02/21/25  Primary Care Provider: Antonio Wolff APRN - CNP    PROVIDER NOTE    Ricardo Rooney III is a 45 y.o. male who presents for the following issues:  Chief Complaint   Patient presents with    New Patient    Lower Back Pain     L4-L5 to L5-S1    Shoulder Pain     Right shoulder         Subjective       Ricardo Rooney III is a very pleasant 45 y.o.  male who presents today to establish with our practice.  He resides in [facility].  He is here today to establish as his PCP was new [facility] and he was unable to get appointments and had difficulty with scheduling.  He has a few ongoing complaints which include lower back pain.  This was previously worked up approximately 8 years ago and surgery was recommended however he declined it at that time.  PT was helpful, back injections were not.  He does not desire injections or referral to pain management and is not seeking narcotics however he does desire further workup as his symptoms are somewhat worsening.  We will order an MRI today.  He also reports overall fatigue and general malaise.  He does have a history of low testosterone previously on injections but is no longer on them.  We will check his testosterone today and evaluate from there.  He is not currently followed by urology.  He is reporting worsening BPH symptoms and he was previously on Flomax.  He states the Flomax initially worked but then it stopped working and he is no longer taking that medication.  He does have a new complaint of right shoulder pain.  He states it feels like it \"clicks\" when he uses his shoulder but he does have full range of motion of that shoulder.  He has never had previous workup PT or an Ortho referral.  We will place that today.

## 2025-02-22 LAB
HCV AB SERPL QL IA: NORMAL
HCV IGG SERPL QL IA: NON REACTIVE S/CO RATIO
TESTOST SERPL-MCNC: 357 NG/DL (ref 264–916)

## 2025-02-24 ENCOUNTER — OFFICE VISIT (OUTPATIENT)
Dept: ORTHOPEDIC SURGERY | Age: 45
End: 2025-02-24
Payer: MEDICAID

## 2025-02-24 ENCOUNTER — TELEPHONE (OUTPATIENT)
Dept: PRIMARY CARE CLINIC | Facility: CLINIC | Age: 45
End: 2025-02-24

## 2025-02-24 VITALS — WEIGHT: 300 LBS | BODY MASS INDEX: 42.95 KG/M2 | HEIGHT: 70 IN

## 2025-02-24 DIAGNOSIS — M25.511 RIGHT SHOULDER PAIN, UNSPECIFIED CHRONICITY: Primary | ICD-10-CM

## 2025-02-24 DIAGNOSIS — M54.2 NECK PAIN: ICD-10-CM

## 2025-02-24 PROCEDURE — 99203 OFFICE O/P NEW LOW 30 MIN: CPT | Performed by: PHYSICIAN ASSISTANT

## 2025-02-24 RX ORDER — CHLORPHENIRAMINE/PHENYLPROPAN 8 MG-75 MG
5000 CAPSULE, EXTENDED RELEASE ORAL DAILY
Qty: 90 CAPSULE | Refills: 3 | Status: SHIPPED | OUTPATIENT
Start: 2025-02-24 | End: 2026-02-24

## 2025-02-24 NOTE — TELEPHONE ENCOUNTER
----- Message from SKIP Menendez - CNP sent at 2/24/2025  5:36 AM EST -----  Good morning  Please reach out to him let him know that we have his lab results.  Overall they are stable.  However his vitamin D should be around 50 and he is hovering at 16 and going to send in a replacement supplement that he will need to take for the next 3 months.    His labs are otherwise very healthy.   His testosterone is at 357 normal is 264 and greater.  This is consistent with where he was about 5 years ago.      Thank you, DAYNA

## 2025-02-24 NOTE — TELEPHONE ENCOUNTER
Left VM for the patient to call back for his lab results, I also let him know. I will be sending him a [place] reminder to call back for his results.

## 2025-02-24 NOTE — PROGRESS NOTES
myeloma    Kidney Disease Maternal Grandmother     Kidney Disease Father     Diabetes Paternal Grandmother     Stroke Paternal Grandmother     Heart Attack Paternal Grandfather     Colon Cancer Paternal Grandfather         Social History:   Social History     Tobacco Use    Smoking status: Every Day     Current packs/day: 0.50     Average packs/day: 0.5 packs/day for 15.0 years (7.5 ttl pk-yrs)     Types: Cigarettes    Smokeless tobacco: Never    Tobacco comments:     Smokes 0.5 ppd x 15 yrs   Substance Use Topics    Alcohol use: Yes     Comment: very rarely         Physical Exam:     General:  On exam the patient is a pleasant 45 y.o. male in no acute distress, A&O x 3.  HEENT: NC/AT, PERRL   Psych: normal mood, normal affect  Lungs: respirations even, normal breath sounds  Skin:  No redness, rashes or wounds of the upper extremities  MSK (Shoulder):   Right shoulder:    180 degrees of active forward flexion   180 degrees passive forward elevation.   Internal rotation is to T6.  External rotation is to 80degrees at the side.   The AC joint is non-tender  SC joint is non-tender.   Greater tuberosity is non-tender.  No tenderness at the proximal biceps tendon   Positive O'Briens sign  Positive impingement sign  Negative lift-off sign  Negative drop sign  Negative hornblower's sign  No posterior glenohumeral joint line tenderness.   No evident excessive external rotation  Rotator cuff strength is 5/5.  No instability  Neurovascularly intact.          Radiographs     X-rays: A/P, Lateral and axillary views of the right shoulder demonstrate: Normal-appearing right shoulder no significant degenerative changes.  No acute bony abnormalities.  X-ray Diagnosis: Normal-appearing right shoulder    X-rays: AP lateral views cervical spine demonstrate loss of normal cervical spine curvature.  No significant degenerative changes.  Mild endplate changes.  No acute bony abnormalities.  X-ray diagnosis: Normal-appearing cervical

## 2025-02-25 ENCOUNTER — TELEPHONE (OUTPATIENT)
Dept: PRIMARY CARE CLINIC | Facility: CLINIC | Age: 45
End: 2025-02-25

## 2025-02-26 ENCOUNTER — TELEPHONE (OUTPATIENT)
Dept: PRIMARY CARE CLINIC | Facility: CLINIC | Age: 45
End: 2025-02-26

## 2025-02-26 LAB
TESTOST FREE SERPL-MCNC: 8.1 PG/ML (ref 6.8–21.5)
TESTOST SERPL-MCNC: 357 NG/DL (ref 264–916)

## 2025-02-26 NOTE — TELEPHONE ENCOUNTER
3rd day calling patient about his lab results, there was no answer. So I left him a VM letting him know to call back and letting him know I will send him a result letter.

## 2025-03-20 RX ORDER — TADALAFIL 20 MG/1
20 TABLET ORAL PRN
Qty: 16 TABLET | Refills: 1 | Status: SHIPPED | OUTPATIENT
Start: 2025-03-20

## 2025-03-20 NOTE — TELEPHONE ENCOUNTER
Patient called for a Medication Refill Request    Name of Medication : tadalafil (CIALIS)     Strength of Medication: 20 MG tablet     Directions: Take 1 tablet by mouth as needed for Erectile Dysfunction     Preferred Pharmacy:   [facility]/pharmacy #[id number] - [place] - [address] [phone]     Last Appt. Date: 2/21/2025    Next Appt. Date: 5/30/2025    NOTE: Pt stated previous provider would give him a 90 day supply.

## 2025-03-24 ENCOUNTER — TELEPHONE (OUTPATIENT)
Dept: INTERNAL MEDICINE CLINIC | Facility: CLINIC | Age: 45
End: 2025-03-24

## 2025-03-24 RX ORDER — TADALAFIL 20 MG/1
20 TABLET ORAL PRN
Qty: 16 TABLET | Refills: 1 | OUTPATIENT
Start: 2025-03-24

## 2025-03-24 NOTE — TELEPHONE ENCOUNTER
Pt was told that he would get a 90 tablet quantity with his Cialis 20 mg. When picked up he only had a quantity of 16. Pt is wanting top know what the discrepancy is?? Please reach out to pt

## 2025-03-25 ENCOUNTER — PREP FOR PROCEDURE (OUTPATIENT)
Dept: GASTROENTEROLOGY | Age: 45
End: 2025-03-25

## 2025-03-25 ENCOUNTER — OFFICE VISIT (OUTPATIENT)
Age: 45
End: 2025-03-25
Payer: MEDICAID

## 2025-03-25 VITALS
DIASTOLIC BLOOD PRESSURE: 74 MMHG | WEIGHT: 300.2 LBS | HEIGHT: 70 IN | TEMPERATURE: 98.7 F | RESPIRATION RATE: 12 BRPM | SYSTOLIC BLOOD PRESSURE: 110 MMHG | HEART RATE: 81 BPM | OXYGEN SATURATION: 95 % | BODY MASS INDEX: 42.98 KG/M2

## 2025-03-25 DIAGNOSIS — Z12.11 ENCOUNTER FOR SCREENING COLONOSCOPY: ICD-10-CM

## 2025-03-25 DIAGNOSIS — Z12.11 ENCOUNTER FOR SCREENING COLONOSCOPY: Primary | ICD-10-CM

## 2025-03-25 DIAGNOSIS — K30 INDIGESTION: ICD-10-CM

## 2025-03-25 DIAGNOSIS — R13.19 ESOPHAGEAL DYSPHAGIA: ICD-10-CM

## 2025-03-25 PROCEDURE — 99204 OFFICE O/P NEW MOD 45 MIN: CPT | Performed by: PHYSICIAN ASSISTANT

## 2025-03-25 NOTE — PROGRESS NOTES
Constitutional:       General: He is not in acute distress.     Appearance: He is obese. He is not ill-appearing, toxic-appearing or diaphoretic.   Eyes:      General: No scleral icterus.  Cardiovascular:      Rate and Rhythm: Normal rate.   Pulmonary:      Effort: Pulmonary effort is normal. No respiratory distress.   Abdominal:      General: There is no distension.      Palpations: Abdomen is soft.      Tenderness: There is no abdominal tenderness. There is no guarding or rebound.   Skin:     General: Skin is dry.      Coloration: Skin is not jaundiced.   Neurological:      Mental Status: He is alert and oriented to person, place, and time.   Psychiatric:         Mood and Affect: Mood normal.         Behavior: Behavior normal.         Thought Content: Thought content normal.         Judgment: Judgment normal.         Physical Exam                 The patient (or guardian, if applicable) and other individuals in attendance with the patient were advised that Artificial Intelligence will be utilized during this visit to record, process the conversation to generate a clinical note, and support improvement of the AI technology. The patient (or guardian, if applicable) and other individuals in attendance at the appointment consented to the use of AI, including the recording.          An electronic signature was used to authenticate this note.    --Melissa R Grinnell, PA-C

## 2025-03-26 PROBLEM — Z12.11 ENCOUNTER FOR SCREENING COLONOSCOPY: Status: ACTIVE | Noted: 2025-03-25

## 2025-03-26 PROBLEM — R13.19 ESOPHAGEAL DYSPHAGIA: Status: ACTIVE | Noted: 2025-03-25

## 2025-03-26 PROBLEM — K30 INDIGESTION: Status: ACTIVE | Noted: 2025-03-25

## 2025-03-27 ENCOUNTER — EVALUATION (OUTPATIENT)
Age: 45
End: 2025-03-27

## 2025-03-27 DIAGNOSIS — M25.611 SHOULDER STIFFNESS, RIGHT: ICD-10-CM

## 2025-03-27 DIAGNOSIS — M25.511 RIGHT SHOULDER PAIN, UNSPECIFIED CHRONICITY: Primary | ICD-10-CM

## 2025-03-27 DIAGNOSIS — M54.9 DORSALGIA: ICD-10-CM

## 2025-03-27 RX ORDER — SODIUM CHLORIDE 0.9 % (FLUSH) 0.9 %
5-40 SYRINGE (ML) INJECTION PRN
Status: CANCELLED | OUTPATIENT
Start: 2025-03-27

## 2025-03-27 RX ORDER — SODIUM CHLORIDE 0.9 % (FLUSH) 0.9 %
5-40 SYRINGE (ML) INJECTION EVERY 12 HOURS SCHEDULED
Status: CANCELLED | OUTPATIENT
Start: 2025-03-27

## 2025-03-27 RX ORDER — SODIUM CHLORIDE 9 MG/ML
25 INJECTION, SOLUTION INTRAVENOUS PRN
Status: CANCELLED | OUTPATIENT
Start: 2025-03-27

## 2025-03-27 NOTE — PROGRESS NOTES
[facility] PT [facility] ORTHOPAEDICS  [address] [place] [id number]  Dept: [phone]      Physical Therapy Initial Assessment     Referring MD: Tyrone Yanez PA  Diagnosis:     ICD-10-CM    1. Right shoulder pain, unspecified chronicity  M25.511       2. Shoulder stiffness, right  M25.611       3. Dorsalgia  M54.9          Surgery: no    Therapy precautions:None  Co-morbidities affecting plan of care: low back pain    Payor: Payor: [employer] [place] MEDICAID /  /  /  Billing pattern: Government- total time   Total Timed Procedure Codes: 10 min, Total Time: 44 min Modifier needed: No  Episode visit count:  1     PERTINENT MEDICAL HISTORY     Past medical and surgical history:   Past Medical History:   Diagnosis Date    Anxiety and depression     hx-- no meds at present    Back pain     Hormone disorder Low T    Diagnosed in 2007    Leg pain     from sciatica    Morbid obesity     BMI = 42    Plantar fasciitis     Sciatica     bilat    Skin tags, anus or rectum 03/05/2024    Excision of anal tags. Santana Bianchi Jr, MD 3/25/24      Past Surgical History:   Procedure Laterality Date    ANUS SURGERY N/A 3/25/2024    EXCISION OF ANAL TAGS performed by Santana Bianchi Jr., MD at [facility] MAIN OR    HERNIA REPAIR Right 2015    ORTHOPEDIC SURGERY Left 1998    Compound fracture left elbow; hardware in place     Medications: reviewed in chart   Allergies:   Allergies   Allergen Reactions    Latex Rash    Meperidine Other (See Comments)     Burns, no analgesic effect    Penicillins Hives and Swelling      Diagnostic exams (per chart review):   X-rays 2/24/2025: A/P, Lateral and axillary views of the right shoulder demonstrate: Normal-appearing right shoulder no significant degenerative changes.  No acute bony abnormalities.     SUBJECTIVE     Chief complaints/history of injury:     Date symptoms began: 8-9 years  Nature of condition: Chronic (continuous duration > 3 months)  Primary cause of current

## 2025-04-07 ENCOUNTER — TREATMENT (OUTPATIENT)
Age: 45
End: 2025-04-07
Payer: MEDICAID

## 2025-04-07 DIAGNOSIS — M54.9 DORSALGIA: ICD-10-CM

## 2025-04-07 DIAGNOSIS — M25.611 SHOULDER STIFFNESS, RIGHT: Primary | ICD-10-CM

## 2025-04-07 DIAGNOSIS — M25.511 RIGHT SHOULDER PAIN, UNSPECIFIED CHRONICITY: ICD-10-CM

## 2025-04-07 PROCEDURE — 97110 THERAPEUTIC EXERCISES: CPT | Performed by: PHYSICAL THERAPIST

## 2025-04-07 NOTE — PROGRESS NOTES
be met by 5/27/2025 (60 days):  Pt will report a 90% improvement since beginning PT.  Pt will have symmetrical shoulder flexion AROM that is WFL and no catching.  Pt will have 4+/5 UE strength.  Pt will have improved GH jt positioning and movement to decrease stress placed upon the biceps and RTC tendons.    [employer]

## 2025-04-08 NOTE — PERIOP NOTE
Patient verified name, , and procedure.    Type: 1a; abbreviated assessment per anesthesia guidelines    Labs per anesthesia: none    Instructed pt that they will be notified the day before their procedure by the GI Lab for time of arrival if their procedure is [place] and Pre-op for [place] cases. Arrival times should be called by 5 pm. If no phone is received the patient should contact their respective hospital. The GI lab telephone number is [phone] and ES Pre-op is [phone].     Follow diet and prep instructions per office. May have clear liquids up to 2 hours prior to hospital arrive time.      Bath or shower the night before and the am of surgery with non-moisturizing soap. No lotions, oils, powders, cologne on skin. No make up, eye make up or jewelry. Wear loose fitting comfortable, clean clothing.     Must have adult present in building the entire time .     Medications for the day of procedure   Cetirizine (Zyrtec)       , patient to hold   Hold tadalafil (Cialis) for 5 days prior to surgery      per anesthesia guidelines.     The following discharge instructions reviewed with patient: medication given during procedure may cause drowsiness for several hours, therefore, do not drive or operate machinery for remainder of the day. You may not drink alcohol on the day of your procedure, please resume regular diet and activity unless otherwise directed. You may experience abdominal distention for several hours that is relieved by the passage of gas. Contact your physician if you have any of the following: fever or chills, severe abdominal pain or excessive amount of bleeding or a large amount when having a bowel movement. Occasional specks of blood with bowel movement would not be unusual.

## 2025-04-10 ENCOUNTER — ANESTHESIA EVENT (OUTPATIENT)
Dept: ENDOSCOPY | Age: 45
End: 2025-04-10
Payer: MEDICAID

## 2025-04-10 RX ORDER — NALOXONE HYDROCHLORIDE 0.4 MG/ML
INJECTION, SOLUTION INTRAMUSCULAR; INTRAVENOUS; SUBCUTANEOUS PRN
Status: CANCELLED | OUTPATIENT
Start: 2025-04-10

## 2025-04-10 RX ORDER — IPRATROPIUM BROMIDE AND ALBUTEROL SULFATE 2.5; .5 MG/3ML; MG/3ML
1 SOLUTION RESPIRATORY (INHALATION)
Status: CANCELLED | OUTPATIENT
Start: 2025-04-10

## 2025-04-10 RX ORDER — ONDANSETRON 2 MG/ML
4 INJECTION INTRAMUSCULAR; INTRAVENOUS
Status: CANCELLED | OUTPATIENT
Start: 2025-04-10

## 2025-04-10 NOTE — PROGRESS NOTES
RN called Pt to confirm appointment time of 1430, arrival time 1300, location,  requirement, and instructions for registration at the hospital.  Pt verbalized understanding.

## 2025-04-11 ENCOUNTER — ANESTHESIA (OUTPATIENT)
Dept: ENDOSCOPY | Age: 45
End: 2025-04-11
Payer: MEDICAID

## 2025-04-11 ENCOUNTER — HOSPITAL ENCOUNTER (OUTPATIENT)
Age: 45
Discharge: HOME | End: 2025-04-11
Attending: STUDENT IN AN ORGANIZED HEALTH CARE EDUCATION/TRAINING PROGRAM | Admitting: STUDENT IN AN ORGANIZED HEALTH CARE EDUCATION/TRAINING PROGRAM
Payer: MEDICAID

## 2025-04-11 VITALS
RESPIRATION RATE: 20 BRPM | HEART RATE: 71 BPM | DIASTOLIC BLOOD PRESSURE: 73 MMHG | SYSTOLIC BLOOD PRESSURE: 119 MMHG | BODY MASS INDEX: 42 KG/M2 | OXYGEN SATURATION: 97 % | WEIGHT: 300 LBS | TEMPERATURE: 97.7 F | HEIGHT: 71 IN

## 2025-04-11 PROCEDURE — 2580000003 HC RX 258: Performed by: SURGERY

## 2025-04-11 PROCEDURE — 88305 TISSUE EXAM BY PATHOLOGIST: CPT

## 2025-04-11 PROCEDURE — 7100000011 HC PHASE II RECOVERY - ADDTL 15 MIN: Performed by: STUDENT IN AN ORGANIZED HEALTH CARE EDUCATION/TRAINING PROGRAM

## 2025-04-11 PROCEDURE — 3609012700 HC EGD DILATION SAVORY: Performed by: STUDENT IN AN ORGANIZED HEALTH CARE EDUCATION/TRAINING PROGRAM

## 2025-04-11 PROCEDURE — 7100000010 HC PHASE II RECOVERY - FIRST 15 MIN: Performed by: STUDENT IN AN ORGANIZED HEALTH CARE EDUCATION/TRAINING PROGRAM

## 2025-04-11 PROCEDURE — 6360000002 HC RX W HCPCS

## 2025-04-11 PROCEDURE — C1769 GUIDE WIRE: HCPCS | Performed by: STUDENT IN AN ORGANIZED HEALTH CARE EDUCATION/TRAINING PROGRAM

## 2025-04-11 PROCEDURE — 2709999900 HC NON-CHARGEABLE SUPPLY: Performed by: STUDENT IN AN ORGANIZED HEALTH CARE EDUCATION/TRAINING PROGRAM

## 2025-04-11 PROCEDURE — 3700000001 HC ADD 15 MINUTES (ANESTHESIA): Performed by: STUDENT IN AN ORGANIZED HEALTH CARE EDUCATION/TRAINING PROGRAM

## 2025-04-11 PROCEDURE — 3700000000 HC ANESTHESIA ATTENDED CARE: Performed by: STUDENT IN AN ORGANIZED HEALTH CARE EDUCATION/TRAINING PROGRAM

## 2025-04-11 PROCEDURE — 3609027000 HC COLONOSCOPY: Performed by: STUDENT IN AN ORGANIZED HEALTH CARE EDUCATION/TRAINING PROGRAM

## 2025-04-11 RX ORDER — PROPOFOL 10 MG/ML
INJECTION, EMULSION INTRAVENOUS
Status: DISCONTINUED | OUTPATIENT
Start: 2025-04-11 | End: 2025-04-11 | Stop reason: SDUPTHER

## 2025-04-11 RX ORDER — SODIUM CHLORIDE 0.9 % (FLUSH) 0.9 %
5-40 SYRINGE (ML) INJECTION EVERY 12 HOURS SCHEDULED
Status: DISCONTINUED | OUTPATIENT
Start: 2025-04-11 | End: 2025-04-11 | Stop reason: HOSPADM

## 2025-04-11 RX ORDER — SODIUM CHLORIDE, SODIUM LACTATE, POTASSIUM CHLORIDE, CALCIUM CHLORIDE 600; 310; 30; 20 MG/100ML; MG/100ML; MG/100ML; MG/100ML
INJECTION, SOLUTION INTRAVENOUS CONTINUOUS
Status: DISCONTINUED | OUTPATIENT
Start: 2025-04-11 | End: 2025-04-11 | Stop reason: HOSPADM

## 2025-04-11 RX ORDER — LIDOCAINE HYDROCHLORIDE 10 MG/ML
1 INJECTION, SOLUTION INFILTRATION; PERINEURAL
Status: DISCONTINUED | OUTPATIENT
Start: 2025-04-11 | End: 2025-04-11 | Stop reason: HOSPADM

## 2025-04-11 RX ORDER — LIDOCAINE HYDROCHLORIDE 20 MG/ML
INJECTION, SOLUTION EPIDURAL; INFILTRATION; INTRACAUDAL; PERINEURAL
Status: DISCONTINUED | OUTPATIENT
Start: 2025-04-11 | End: 2025-04-11 | Stop reason: SDUPTHER

## 2025-04-11 RX ORDER — SODIUM CHLORIDE 0.9 % (FLUSH) 0.9 %
5-40 SYRINGE (ML) INJECTION PRN
Status: DISCONTINUED | OUTPATIENT
Start: 2025-04-11 | End: 2025-04-11 | Stop reason: HOSPADM

## 2025-04-11 RX ORDER — SODIUM CHLORIDE 9 MG/ML
INJECTION, SOLUTION INTRAVENOUS PRN
Status: DISCONTINUED | OUTPATIENT
Start: 2025-04-11 | End: 2025-04-11 | Stop reason: HOSPADM

## 2025-04-11 RX ADMIN — PROPOFOL 100 MG: 10 INJECTION, EMULSION INTRAVENOUS at 14:50

## 2025-04-11 RX ADMIN — LIDOCAINE HYDROCHLORIDE 50 MG: 20 INJECTION, SOLUTION EPIDURAL; INFILTRATION; INTRACAUDAL; PERINEURAL at 14:50

## 2025-04-11 RX ADMIN — SODIUM CHLORIDE, POTASSIUM CHLORIDE, SODIUM LACTATE AND CALCIUM CHLORIDE: 600; 310; 30; 20 INJECTION, SOLUTION INTRAVENOUS at 13:32

## 2025-04-11 RX ADMIN — PROPOFOL 200 MCG/KG/MIN: 10 INJECTION, EMULSION INTRAVENOUS at 14:51

## 2025-04-11 ASSESSMENT — LIFESTYLE VARIABLES: SMOKING_STATUS: 1

## 2025-04-11 ASSESSMENT — PAIN - FUNCTIONAL ASSESSMENT: PAIN_FUNCTIONAL_ASSESSMENT: 0-10

## 2025-04-11 NOTE — ANESTHESIA PRE PROCEDURE
Smokeless tobacco: Never    Tobacco comments:     Smokes 0.5 ppd x 15 yrs   Substance Use Topics    Alcohol use: Yes     Comment: very rarely                                Ready to quit: Not Answered  Counseling given: Not Answered  Tobacco comments: Smokes 0.5 ppd x 15 yrs      Vital Signs (Current):   Vitals:    04/08/25 1427 04/11/25 1326   BP:  123/72   Pulse:  83   Resp:  18   Temp:  98.1 °F (36.7 °C)   TempSrc:  Oral   SpO2:  95%   Weight: 136.1 kg (300 lb) 136.1 kg (300 lb)   Height: 1.791 m (5' 10.5\") 1.791 m (5' 10.5\")                                              BP Readings from Last 3 Encounters:   04/11/25 123/72   03/25/25 110/74   02/21/25 120/72       NPO Status: Time of last liquid consumption: 1230 (32 oz apple juice)                        Time of last solid consumption: 2200                        Date of last liquid consumption: 04/11/25                        Date of last solid food consumption: 04/09/25    BMI:   Wt Readings from Last 3 Encounters:   04/11/25 136.1 kg (300 lb)   03/25/25 (!) 136.2 kg (300 lb 3.2 oz)   02/24/25 136.1 kg (300 lb)     Body mass index is 42.44 kg/m².    CBC:   Lab Results   Component Value Date/Time    WBC 8.5 02/21/2025 10:35 AM    RBC 5.01 02/21/2025 10:35 AM    HGB 16.3 02/21/2025 10:35 AM    HCT 47.2 02/21/2025 10:35 AM    MCV 94.2 02/21/2025 10:35 AM    RDW 12.5 02/21/2025 10:35 AM     02/21/2025 10:35 AM       CMP:   Lab Results   Component Value Date/Time     02/21/2025 10:35 AM    K 4.7 02/21/2025 10:35 AM     02/21/2025 10:35 AM    CO2 26 02/21/2025 10:35 AM    BUN 10 02/21/2025 10:35 AM    CREATININE 0.94 02/21/2025 10:35 AM    GFRAA 105 02/27/2020 11:27 AM    AGRATIO 2.0 02/27/2020 11:27 AM    LABGLOM >90 02/21/2025 10:35 AM    GLUCOSE 98 02/21/2025 10:35 AM    CALCIUM 9.8 02/21/2025 10:35 AM    BILITOT 0.2 02/21/2025 10:35 AM    ALKPHOS 135 02/21/2025 10:35 AM    ALKPHOS 127 02/27/2020 11:27 AM    AST 16 02/21/2025 10:35 AM    ALT 22

## 2025-04-11 NOTE — ANESTHESIA POSTPROCEDURE EVALUATION
Department of Anesthesiology  Postprocedure Note    Patient: Ricardo Rooney III  MRN: [id number]  YOB: 1980  Date of evaluation: 4/11/2025    Procedure Summary       Date: 04/11/25 Room / Location: [facility] ENDO 04 / [facility] ENDOSCOPY    Anesthesia Start: 1445 Anesthesia Stop: 1516    Procedures:       COLORECTAL CANCER SCREENING, NOT HIGH RISK (Lower GI Region)      ESOPHAGOGASTRODUODENOSCOPY DILATION SAVORY and bxs (Upper GI Region) Diagnosis:       Encounter for screening colonoscopy      Esophageal dysphagia      Indigestion      (Encounter for screening colonoscopy [Z12.11])      (Esophageal dysphagia [R13.19])      (Indigestion [K30])    Surgeons: Clara Painting MD Responsible Provider: Rojas Plummer MD    Anesthesia Type: TIVA ASA Status: 3            Anesthesia Type: TIVA    Clay Phase I: Clay Score: 10    Clay Phase II: Clay Score: 10    Anesthesia Post Evaluation    Patient location during evaluation: PACU  Patient participation: complete - patient participated  Level of consciousness: awake and alert  Airway patency: patent  Nausea & Vomiting: no nausea and no vomiting  Cardiovascular status: hemodynamically stable  Respiratory status: acceptable, nonlabored ventilation and spontaneous ventilation  Hydration status: euvolemic  Comments: /73   Pulse 71   Temp 97.7 °F (36.5 °C) (Skin)   Resp 20   Ht 1.791 m (5' 10.5\")   Wt 136.1 kg (300 lb)   SpO2 97%   BMI 42.44 kg/m²     Multimodal analgesia pain management approach  Pain management: adequate and satisfactory to patient    No notable events documented.

## 2025-04-11 NOTE — H&P
Ricardo Rooney III (:  1980) is a 45 y.o. male new patient referred to our office for evaluation of the following chief complaint(s):  New Patient (Screen for colon cancer, patient states he had hemorrhoids surgically removed still having trouble in that area)           Assessment & Plan  ASSESSMENT/PLAN:  1. Encounter for screening colonoscopy  2. Esophageal dysphagia  3. Indigestion        Assessment & Plan     -PGF had colon cancer at 46. No prior surveillance; will schedule.  -Counseled patient and provided written recommendations for diet and lifestyle modifications for GERD. Avoid tobacco, alcohol, NSAIDs. Hx of indigestion as well as solid-food dysphagia. He suspects hiatal hernia. Wants to hold off on PPI or H2-blocker for endoscopy results. Will arrange simultaneously for evaluation and possible dilation.     Results        Return for scheduled colonoscopy, scheduled EGD.           Subjective  SUBJECTIVE/OBJECTIVE  Ricardo Rooney III is a 45 y.o. year old male referred to our office for evaluation of colon cancer screening with family history of GI cancer. Referral notes reviewed.  No prior GI or endoscopy records discovered on chart review.     History of Present Illness     Patient reports PGF had colon cancer (45). Otherwise denies family hx of GI malignancy or IBD. Denies prior EGD or colonoscopy. Shx is pertinent for right inguinal hernia repair. Denies OAC or frequent NSAID use. He smokes 1/2 ppd x 15 years. Drinks EtOH rarely, maybe twice a year. He smokes marijuana daily.     Today patient denies abdominal pain, nausea, vomiting, constipation, melena, hematochezia. He leans toward loose stool. Takes fiber supplement daily. Taking lactaid which helps stools to be more firm. He does not avoid dairy products or have any pain with dairy intake. Has some indigestion and feels he may have a hiatal hernia. Greasy foods, pretzels or other snack foods can trigger indigestion. He  other systems reviewed and are negative except as noted above.            Objective      Vitals:     03/25/25 1401   BP: 110/74   Pulse: 81   Resp: 12   Temp: 98.7 °F (37.1 °C)   SpO2: 95%         Physical Exam  Vitals reviewed.   Constitutional:       General: He is not in acute distress.     Appearance: He is obese. He is not ill-appearing, toxic-appearing or diaphoretic.   Eyes:      General: No scleral icterus.  Cardiovascular:      Rate and Rhythm: Normal rate.   Pulmonary:      Effort: Pulmonary effort is normal. No respiratory distress.   Abdominal:      General: There is no distension.      Palpations: Abdomen is soft.      Tenderness: There is no abdominal tenderness. There is no guarding or rebound.   Skin:     General: Skin is dry.      Coloration: Skin is not jaundiced.   Neurological:      Mental Status: He is alert and oriented to person, place, and time.   Psychiatric:         Mood and Affect: Mood normal.         Behavior: Behavior normal.         Thought Content: Thought content normal.         Judgment: Judgment normal.            Physical Exam                       The patient (or guardian, if applicable) and other individuals in attendance with the patient were advised that Artificial Intelligence will be utilized during this visit to record, process the conversation to generate a clinical note, and support improvement of the AI technology. The patient (or guardian, if applicable) and other individuals in attendance at the appointment consented to the use of AI, including the recording.             An electronic signature was used to authenticate this note.

## 2025-04-14 ENCOUNTER — TREATMENT (OUTPATIENT)
Age: 45
End: 2025-04-14
Payer: MEDICAID

## 2025-04-14 DIAGNOSIS — M54.9 DORSALGIA: ICD-10-CM

## 2025-04-14 DIAGNOSIS — M25.611 SHOULDER STIFFNESS, RIGHT: Primary | ICD-10-CM

## 2025-04-14 DIAGNOSIS — M25.511 RIGHT SHOULDER PAIN, UNSPECIFIED CHRONICITY: ICD-10-CM

## 2025-04-14 PROCEDURE — 97110 THERAPEUTIC EXERCISES: CPT | Performed by: PHYSICAL THERAPIST

## 2025-04-14 PROCEDURE — 97140 MANUAL THERAPY 1/> REGIONS: CPT | Performed by: PHYSICAL THERAPIST

## 2025-04-14 NOTE — PROGRESS NOTES
[facility] ORTHOPAEDICS  [address] [facility] [address]  Dept: [phone]      Physical Therapy Daily Note     Referring MD: Tyrone Yanez PA  Diagnosis:     ICD-10-CM    1. Shoulder stiffness, right  M25.611       2. Right shoulder pain, unspecified chronicity  M25.511       3. Dorsalgia  M54.9         Surgery: no    Therapy precautions:None  Co-morbidities affecting plan of care: low back pain    Payor: Payor: [employer] [place] MEDICAID /  /  /  Billing pattern: Government- total time   Total Timed Procedure Codes: 50 min, Total Time: 50 min Modifier needed: No  Episode visit count:  3     PERTINENT MEDICAL HISTORY     Chief complaints/history of injury:     Date symptoms began: 8-9 years  Nature of condition: Chronic (continuous duration > 3 months)  Primary cause of current episode: Unspecified  How did symptoms start: Mr. Rooney has been experiencing right shoulder pain and catching for 8-9 years. He can feel it with shoulder abduction, cleaning, and weight bearing through the right shoulder. He thinks sleeping on his arm contributes to his symptoms. He can't do yardwork, and it affects him at work when reaching for things.  Describe current symptoms: catching in right shoulder that affects ability to perform ADLs    Received previous therapy? No    Pain Assessment:  Pain location: R shoulder    Average Pain/symptom intensity (0-10 scale)  Last 24 hours: 1/10  Last week (1-7 days): 1/10  How often do you feel symptoms? Daily when provoked  Description: catching  Aggravating factors: shoulder abduction, laying on R shoulder, yardwork, weight bearing through R UE  Alleviating factors: avoiding provoking activities    Neuro screen: numbness in R arm when laying on his side or leaning on his shoulder    Social/Functional Hx:  How would you rate your overall health? good  Pt lives alone in a(n) 1 story house.  Current DME: none  Work Status: Self-employed    Sleep: affected by

## 2025-04-16 ENCOUNTER — TREATMENT (OUTPATIENT)
Age: 45
End: 2025-04-16
Payer: MEDICAID

## 2025-04-16 DIAGNOSIS — M25.611 SHOULDER STIFFNESS, RIGHT: Primary | ICD-10-CM

## 2025-04-16 DIAGNOSIS — M54.9 DORSALGIA: ICD-10-CM

## 2025-04-16 DIAGNOSIS — M25.511 RIGHT SHOULDER PAIN, UNSPECIFIED CHRONICITY: ICD-10-CM

## 2025-04-16 PROCEDURE — 97140 MANUAL THERAPY 1/> REGIONS: CPT | Performed by: PHYSICAL THERAPIST

## 2025-04-16 PROCEDURE — 97016 VASOPNEUMATIC DEVICE THERAPY: CPT | Performed by: PHYSICAL THERAPIST

## 2025-04-16 PROCEDURE — 97110 THERAPEUTIC EXERCISES: CPT | Performed by: PHYSICAL THERAPIST

## 2025-04-16 NOTE — PROGRESS NOTES
[facility] PT INT [facility] ORTHOPAEDICS  [address] [facility] [address]  Dept: [phone]      Physical Therapy Daily Note     Referring MD: Tyrone Yanez PA  Diagnosis:     ICD-10-CM    1. Shoulder stiffness, right  M25.611       2. Right shoulder pain, unspecified chronicity  M25.511       3. Dorsalgia  M54.9         Surgery: no    Therapy precautions:None  Co-morbidities affecting plan of care: low back pain    Payor: Payor: [employer] [place] MEDICAID /  /  /  Billing pattern: Government- total time   Total Timed Procedure Codes: 38 min, Total Time: 48 min Modifier needed: No  Episode visit count:  4     PERTINENT MEDICAL HISTORY     Chief complaints/history of injury:     Date symptoms began: 8-9 years  Nature of condition: Chronic (continuous duration > 3 months)  Primary cause of current episode: Unspecified  How did symptoms start: Mr. Rooney has been experiencing right shoulder pain and catching for 8-9 years. He can feel it with shoulder abduction, cleaning, and weight bearing through the right shoulder. He thinks sleeping on his arm contributes to his symptoms. He can't do yardwork, and it affects him at work when reaching for things.  Describe current symptoms: catching in right shoulder that affects ability to perform ADLs    Received previous therapy? No    Pain Assessment:  Pain location: R shoulder    Average Pain/symptom intensity (0-10 scale)  Last 24 hours: 1/10  Last week (1-7 days): 1/10  How often do you feel symptoms? Daily when provoked  Description: catching  Aggravating factors: shoulder abduction, laying on R shoulder, yardwork, weight bearing through R UE  Alleviating factors: avoiding provoking activities    Neuro screen: numbness in R arm when laying on his side or leaning on his shoulder    Social/Functional Hx:  How would you rate your overall health? good  Pt lives alone in a(n) 1 story house.  Current DME: none  Work Status: Self-employed    Sleep: affected by

## 2025-04-21 ENCOUNTER — RESULTS FOLLOW-UP (OUTPATIENT)
Dept: GASTROENTEROLOGY | Age: 45
End: 2025-04-21

## 2025-04-21 ENCOUNTER — TREATMENT (OUTPATIENT)
Age: 45
End: 2025-04-21
Payer: MEDICAID

## 2025-04-21 DIAGNOSIS — M25.611 SHOULDER STIFFNESS, RIGHT: Primary | ICD-10-CM

## 2025-04-21 DIAGNOSIS — M25.511 RIGHT SHOULDER PAIN, UNSPECIFIED CHRONICITY: ICD-10-CM

## 2025-04-21 DIAGNOSIS — M54.9 DORSALGIA: ICD-10-CM

## 2025-04-21 PROCEDURE — 97110 THERAPEUTIC EXERCISES: CPT | Performed by: PHYSICAL THERAPIST

## 2025-04-21 PROCEDURE — 97140 MANUAL THERAPY 1/> REGIONS: CPT | Performed by: PHYSICAL THERAPIST

## 2025-04-21 NOTE — PROGRESS NOTES
[facility] PT INT [facility] ORTHOPAEDICS  [address] [facility] [address]  Dept: [phone]      Physical Therapy Daily Note     Referring MD: Tyrone Yanez PA  Diagnosis:     ICD-10-CM    1. Shoulder stiffness, right  M25.611       2. Right shoulder pain, unspecified chronicity  M25.511       3. Dorsalgia  M54.9           Surgery: no    Therapy precautions:None  Co-morbidities affecting plan of care: low back pain    Payor: Payor: [employer] [place] MEDICAID /  /  /  Billing pattern: Government- total time   Total Timed Procedure Codes: 38 min, Total Time: 42 min Modifier needed: No  Episode visit count:  5     PERTINENT MEDICAL HISTORY     Chief complaints/history of injury:     Date symptoms began: 8-9 years  Nature of condition: Chronic (continuous duration > 3 months)  Primary cause of current episode: Unspecified  How did symptoms start: Mr. Rooney has been experiencing right shoulder pain and catching for 8-9 years. He can feel it with shoulder abduction, cleaning, and weight bearing through the right shoulder. He thinks sleeping on his arm contributes to his symptoms. He can't do yardwork, and it affects him at work when reaching for things.  Describe current symptoms: catching in right shoulder that affects ability to perform ADLs    Received previous therapy? No    Pain Assessment:  Pain location: R shoulder    Average Pain/symptom intensity (0-10 scale)  Last 24 hours: 1/10  Last week (1-7 days): 1/10  How often do you feel symptoms? Daily when provoked  Description: catching  Aggravating factors: shoulder abduction, laying on R shoulder, yardwork, weight bearing through R UE  Alleviating factors: avoiding provoking activities    Neuro screen: numbness in R arm when laying on his side or leaning on his shoulder    Social/Functional Hx:  How would you rate your overall health? good  Pt lives alone in a(n) 1 story house.  Current DME: none  Work Status: Self-employed    Sleep: affected by

## 2025-04-25 ENCOUNTER — TREATMENT (OUTPATIENT)
Age: 45
End: 2025-04-25

## 2025-04-25 DIAGNOSIS — M25.611 SHOULDER STIFFNESS, RIGHT: Primary | ICD-10-CM

## 2025-04-25 DIAGNOSIS — M25.511 RIGHT SHOULDER PAIN, UNSPECIFIED CHRONICITY: ICD-10-CM

## 2025-04-25 DIAGNOSIS — Z78.9 IMPAIRED MOBILITY AND ADLS: ICD-10-CM

## 2025-04-25 DIAGNOSIS — Z74.09 IMPAIRED MOBILITY AND ADLS: ICD-10-CM

## 2025-04-25 PROBLEM — Z12.11 ENCOUNTER FOR SCREENING COLONOSCOPY: Status: RESOLVED | Noted: 2025-03-25 | Resolved: 2025-04-25

## 2025-04-25 NOTE — PROGRESS NOTES
[facility] PT INT [facility] ORTHOPAEDICS  [address] [facility] [address]  Dept: [phone]      Physical Therapy Daily Note     Referring MD: Tyrone Yanez PA  Diagnosis:     ICD-10-CM    1. Shoulder stiffness, right  M25.611       2. Right shoulder pain, unspecified chronicity  M25.511       3. Impaired mobility and ADLs  Z74.09     Z78.9           Surgery: no    Therapy precautions:None  Co-morbidities affecting plan of care: low back pain    Payor: Payor: [employer] [place] MEDICAID /  /  /  Billing pattern: Government- total time   Total Timed Procedure Codes: 40 min, Total Time: 45 min Modifier needed: No  Episode visit count:  6     PERTINENT MEDICAL HISTORY     Chief complaints/history of injury:     Date symptoms began: 8-9 years  Nature of condition: Chronic (continuous duration > 3 months)  Primary cause of current episode: Unspecified  How did symptoms start: Mr. Rooney has been experiencing right shoulder pain and catching for 8-9 years. He can feel it with shoulder abduction, cleaning, and weight bearing through the right shoulder. He thinks sleeping on his arm contributes to his symptoms. He can't do yardwork, and it affects him at work when reaching for things.  Describe current symptoms: catching in right shoulder that affects ability to perform ADLs    Received previous therapy? No    Pain Assessment:  Pain location: R shoulder    Average Pain/symptom intensity (0-10 scale)  Last 24 hours: 1/10  Last week (1-7 days): 1/10  How often do you feel symptoms? Daily when provoked  Description: catching  Aggravating factors: shoulder abduction, laying on R shoulder, yardwork, weight bearing through R UE  Alleviating factors: avoiding provoking activities    Neuro screen: numbness in R arm when laying on his side or leaning on his shoulder    Social/Functional Hx:  How would you rate your overall health? good  Pt lives alone in a(n) 1 story house.  Current DME: none  Work Status:

## 2025-04-28 ENCOUNTER — TREATMENT (OUTPATIENT)
Age: 45
End: 2025-04-28
Payer: MEDICAID

## 2025-04-28 DIAGNOSIS — M54.9 DORSALGIA: ICD-10-CM

## 2025-04-28 DIAGNOSIS — M25.611 SHOULDER STIFFNESS, RIGHT: Primary | ICD-10-CM

## 2025-04-28 DIAGNOSIS — M25.511 RIGHT SHOULDER PAIN, UNSPECIFIED CHRONICITY: ICD-10-CM

## 2025-04-28 DIAGNOSIS — Z78.9 IMPAIRED MOBILITY AND ADLS: ICD-10-CM

## 2025-04-28 DIAGNOSIS — Z74.09 IMPAIRED MOBILITY AND ADLS: ICD-10-CM

## 2025-04-28 PROCEDURE — 97016 VASOPNEUMATIC DEVICE THERAPY: CPT | Performed by: PHYSICAL THERAPIST

## 2025-04-28 PROCEDURE — 97110 THERAPEUTIC EXERCISES: CPT | Performed by: PHYSICAL THERAPIST

## 2025-04-30 ENCOUNTER — TREATMENT (OUTPATIENT)
Age: 45
End: 2025-04-30
Payer: MEDICAID

## 2025-04-30 DIAGNOSIS — M25.511 RIGHT SHOULDER PAIN, UNSPECIFIED CHRONICITY: ICD-10-CM

## 2025-04-30 DIAGNOSIS — M25.611 SHOULDER STIFFNESS, RIGHT: Primary | ICD-10-CM

## 2025-04-30 DIAGNOSIS — M54.9 DORSALGIA: ICD-10-CM

## 2025-04-30 DIAGNOSIS — Z78.9 IMPAIRED MOBILITY AND ADLS: ICD-10-CM

## 2025-04-30 DIAGNOSIS — Z74.09 IMPAIRED MOBILITY AND ADLS: ICD-10-CM

## 2025-04-30 PROCEDURE — 97110 THERAPEUTIC EXERCISES: CPT | Performed by: PHYSICAL THERAPIST

## 2025-04-30 PROCEDURE — 97016 VASOPNEUMATIC DEVICE THERAPY: CPT | Performed by: PHYSICAL THERAPIST

## 2025-04-30 NOTE — PROGRESS NOTES
[facility] PT INT [facility] ORTHOPAEDICS  [address] [facility] [address]  Dept: [phone]      Physical Therapy Updated Plan of Care     Referring MD: Tyrone Yanez PA  Diagnosis:     ICD-10-CM    1. Shoulder stiffness, right  M25.611       2. Right shoulder pain, unspecified chronicity  M25.511       3. Impaired mobility and ADLs  Z74.09     Z78.9       4. Dorsalgia  M54.9         Surgery: no    Therapy precautions:None  Co-morbidities affecting plan of care: low back pain    Payor: Payor: [employer] [place] /  /  /  Billing pattern: Government- total time   Total Timed Procedure Codes: 40 min, Total Time: 55 min Modifier needed: No  Episode visit count:  8     PERTINENT MEDICAL HISTORY     Chief complaints/history of injury:     Date symptoms began: 8-9 years  Nature of condition: Chronic (continuous duration > 3 months)  Primary cause of current episode: Unspecified  How did symptoms start: Mr. Rooney has been experiencing right shoulder pain and catching for 8-9 years. He can feel it with shoulder abduction, cleaning, and weight bearing through the right shoulder. He thinks sleeping on his arm contributes to his symptoms. He can't do yardwork, and it affects him at work when reaching for things.  Describe current symptoms: catching in right shoulder that affects ability to perform ADLs    Received previous therapy? No    Pain Assessment:  Pain location: R shoulder    Average Pain/symptom intensity (0-10 scale)  Last 24 hours: 2/10  Last week (1-7 days): 3/10  How often do you feel symptoms? Daily when provoked  Description: catching  Aggravating factors: shoulder abduction, laying on R shoulder, yardwork, weight bearing through R UE  Alleviating factors: avoiding provoking activities    Neuro screen: numbness in R arm when laying on his side or leaning on his shoulder    Social/Functional Hx:  How would you rate your overall health? good  Pt lives alone in a(n) 1 story

## 2025-05-05 ENCOUNTER — OFFICE VISIT (OUTPATIENT)
Dept: ORTHOPEDIC SURGERY | Age: 45
End: 2025-05-05
Payer: MEDICAID

## 2025-05-05 DIAGNOSIS — M75.41 SHOULDER IMPINGEMENT SYNDROME, RIGHT: Primary | ICD-10-CM

## 2025-05-05 PROCEDURE — 99212 OFFICE O/P EST SF 10 MIN: CPT | Performed by: PHYSICIAN ASSISTANT

## 2025-05-05 NOTE — PROGRESS NOTES
[facility]          Patient ID:  Name: Ricardo Rooney III  AGE/Gender: 45 y.o. male  MRN: [id number]  : 1980    Date of Consultation:  May 5, 2025          ALLERGIES:   Allergies   Allergen Reactions    Latex Rash    Meperidine Other (See Comments)     Burns, no analgesic effect    Penicillins Hives and Swelling        CC: Recheck right shoulder    History:  The patient presents today for recheck of the right shoulder. The patient was seen in February and found to have impingement syndrome of the right shoulder.  He was referred to physical therapy.  He has completed 8 of the 12 visits.  He feels that he is making slow progress but is improving.  He is certainly not worse than he was before..       Physical Exam:     General:  On exam the patient is a pleasant 45 y.o. male in no acute distress, A&O x 3.  HEENT: NC/AT, PERRL   Psych: normal mood, normal affect  Lungs: respirations even, normal breath sounds  MSK: Range of motion of the right shoulder is unchanged from prior exam.  Vascular: No distal edema or clubbing, Distal pulses are intact  Neurologic: Normal.         Assessment:     Impingement syndrome right shoulder      Medical Decision Making and Plan:    Chart reviewed  The patient has impingement syndrome of the right shoulder.  We discussed continued conservative management or proceed with MRI scan.  He feels that he would like to give this some more time before considering surgery.  He will continue his physical therapy and will follow-up with us at his discretion after he finishes physical therapy.       Time spent in preparation, chart review, and direct patient care was 10 minutes    Electronically signed by:   CAITLIN Dutton  2025,  2:57 PM

## 2025-05-29 SDOH — ECONOMIC STABILITY: INCOME INSECURITY: IN THE LAST 12 MONTHS, WAS THERE A TIME WHEN YOU WERE NOT ABLE TO PAY THE MORTGAGE OR RENT ON TIME?: PATIENT DECLINED

## 2025-05-29 SDOH — ECONOMIC STABILITY: FOOD INSECURITY: WITHIN THE PAST 12 MONTHS, YOU WORRIED THAT YOUR FOOD WOULD RUN OUT BEFORE YOU GOT MONEY TO BUY MORE.: PATIENT DECLINED

## 2025-05-29 SDOH — ECONOMIC STABILITY: TRANSPORTATION INSECURITY
IN THE PAST 12 MONTHS, HAS THE LACK OF TRANSPORTATION KEPT YOU FROM MEDICAL APPOINTMENTS OR FROM GETTING MEDICATIONS?: PATIENT DECLINED

## 2025-05-29 SDOH — ECONOMIC STABILITY: FOOD INSECURITY: WITHIN THE PAST 12 MONTHS, THE FOOD YOU BOUGHT JUST DIDN'T LAST AND YOU DIDN'T HAVE MONEY TO GET MORE.: PATIENT DECLINED

## 2025-05-29 SDOH — ECONOMIC STABILITY: TRANSPORTATION INSECURITY
IN THE PAST 12 MONTHS, HAS LACK OF TRANSPORTATION KEPT YOU FROM MEETINGS, WORK, OR FROM GETTING THINGS NEEDED FOR DAILY LIVING?: PATIENT DECLINED

## 2025-05-30 ENCOUNTER — OFFICE VISIT (OUTPATIENT)
Dept: PRIMARY CARE CLINIC | Facility: CLINIC | Age: 45
End: 2025-05-30

## 2025-05-30 VITALS
OXYGEN SATURATION: 97 % | BODY MASS INDEX: 40.88 KG/M2 | DIASTOLIC BLOOD PRESSURE: 62 MMHG | HEIGHT: 71 IN | HEART RATE: 67 BPM | WEIGHT: 292 LBS | SYSTOLIC BLOOD PRESSURE: 118 MMHG

## 2025-05-30 DIAGNOSIS — N40.1 BENIGN PROSTATIC HYPERPLASIA WITH INCOMPLETE BLADDER EMPTYING: ICD-10-CM

## 2025-05-30 DIAGNOSIS — R39.14 BENIGN PROSTATIC HYPERPLASIA WITH INCOMPLETE BLADDER EMPTYING: ICD-10-CM

## 2025-05-30 DIAGNOSIS — L98.9 LESION OF SKIN OF SCALP: Primary | ICD-10-CM

## 2025-05-30 PROBLEM — K30 INDIGESTION: Status: RESOLVED | Noted: 2025-03-25 | Resolved: 2025-05-30

## 2025-05-30 RX ORDER — MELOXICAM 15 MG/1
15 TABLET ORAL DAILY
COMMUNITY

## 2025-05-30 RX ORDER — TRIAMCINOLONE ACETONIDE 1 MG/G
OINTMENT TOPICAL
COMMUNITY
Start: 2024-11-27

## 2025-05-30 RX ORDER — TADALAFIL 10 MG/1
10 TABLET ORAL DAILY
Qty: 30 TABLET | Refills: 0 | Status: SHIPPED | OUTPATIENT
Start: 2025-05-30 | End: 2025-06-29

## 2025-05-30 RX ORDER — IBUPROFEN 200 MG
200 TABLET ORAL EVERY 8 HOURS PRN
COMMUNITY

## 2025-05-30 RX ORDER — FINASTERIDE 5 MG/1
5 TABLET, FILM COATED ORAL DAILY
Qty: 30 TABLET | Refills: 2 | Status: SHIPPED | OUTPATIENT
Start: 2025-05-30 | End: 2025-08-28

## 2025-05-30 RX ORDER — NAPROXEN 250 MG/1
250 TABLET ORAL EVERY 12 HOURS
COMMUNITY

## 2025-05-30 RX ORDER — HYDROCORTISONE 25 MG/G
CREAM TOPICAL
COMMUNITY
Start: 2024-02-15

## 2025-05-30 RX ORDER — CLINDAMYCIN PHOSPHATE 10 UG/ML
LOTION TOPICAL
COMMUNITY

## 2025-05-30 RX ORDER — CYCLOBENZAPRINE HCL 10 MG
TABLET ORAL
COMMUNITY

## 2025-05-30 NOTE — PROGRESS NOTES
[facility] [place]  Antonio Wolff, MSN, APRN, FNP-c  [address] [address]  [phone]    CHRONIC CARE MANAGEMENT     Date of Service: 05/30/25  Primary Care Provider: Antonio Wolff APRN - CNP    PROVIDER NOTE    Ricardo Rooney III is a 45 y.o. male who presents for the following issues:  Chief Complaint   Patient presents with    Follow-up    Medication Refill    Discuss Medications     Tadalafil - requesting script be one tab daily      Subjective       45-year-old male presents today for follow-up visit.  He continues to experience BPH issue and has a scheduled appointment with urology in 2 weeks.  He is requesting to be placed back on his Cialis 10 mg once daily which helped with his BPH.  We will place this today.  He states that Flomax is ineffective.  We can try Proscar.  Previous labs stable.  He also has some skin lesions he would like evaluated and is requesting referral to dermatology        Social History     Tobacco Use    Smoking status: Every Day     Current packs/day: 0.50     Average packs/day: 0.5 packs/day for 15.0 years (7.5 ttl pk-yrs)     Types: Cigarettes    Smokeless tobacco: Never    Tobacco comments:     Smokes 0.5 ppd x 15 yrs   Vaping Use    Vaping status: Never Used   Substance Use Topics    Alcohol use: Yes     Comment: very rarely    Drug use: Not Currently     Types: Marijuana (Weed)     Comment: smokes daily-- none 3/18/24       Past Medical History:   Diagnosis Date    Abdominal hernia 2016    Right inguinal hernia repaired; newer left inguinal hernia (minor) still present    Acid reflux     Anxiety and depression     hx-- no meds at present    Back pain     Carpal tunnel syndrome of right wrist     Erectile dysfunction     External hemorrhoids     Herniated nucleus pulposus, L4-5 right     Herniated nucleus pulposus, L5-S1, left     Hidradenitis suppurativa     Hormone disorder Low T    Diagnosed in 2007

## 2025-06-12 ENCOUNTER — PROCEDURE VISIT (OUTPATIENT)
Dept: UROLOGY | Age: 45
End: 2025-06-12
Payer: MEDICAID

## 2025-06-12 DIAGNOSIS — N40.1 BPH WITH OBSTRUCTION/LOWER URINARY TRACT SYMPTOMS: ICD-10-CM

## 2025-06-12 DIAGNOSIS — N32.89 BLADDER MASS: Primary | ICD-10-CM

## 2025-06-12 DIAGNOSIS — N13.8 BPH WITH OBSTRUCTION/LOWER URINARY TRACT SYMPTOMS: ICD-10-CM

## 2025-06-12 DIAGNOSIS — N39.43 POST-VOID DRIBBLING: ICD-10-CM

## 2025-06-12 LAB
BILIRUBIN, URINE, POC: NEGATIVE
BLOOD URINE, POC: NORMAL
GLUCOSE URINE, POC: NEGATIVE MG/DL
KETONES, URINE, POC: NEGATIVE MG/DL
LEUKOCYTE ESTERASE, URINE, POC: NEGATIVE
NITRITE, URINE, POC: NEGATIVE
PH, URINE, POC: 6.5 (ref 4.6–8)
PROTEIN,URINE, POC: NEGATIVE MG/DL
SPECIFIC GRAVITY, URINE, POC: 1.01 (ref 1–1.03)
URINALYSIS CLARITY, POC: NORMAL
URINALYSIS COLOR, POC: NORMAL
UROBILINOGEN, POC: NORMAL MG/DL

## 2025-06-12 PROCEDURE — 81003 URINALYSIS AUTO W/O SCOPE: CPT | Performed by: UROLOGY

## 2025-06-12 PROCEDURE — 99214 OFFICE O/P EST MOD 30 MIN: CPT | Performed by: UROLOGY

## 2025-06-12 PROCEDURE — 52000 CYSTOURETHROSCOPY: CPT | Performed by: UROLOGY

## 2025-06-12 RX ORDER — TAMSULOSIN HYDROCHLORIDE 0.4 MG/1
0.4 CAPSULE ORAL EVERY EVENING
Qty: 90 CAPSULE | Refills: 3 | Status: SHIPPED | OUTPATIENT
Start: 2025-06-12

## 2025-06-12 NOTE — PROGRESS NOTES
ENDOSCOPY     Current Outpatient Medications   Medication Sig Dispense Refill    clindamycin (CLEOCIN T) 1 % lotion APPLY TO PIMPLE PRONE AREAS TWICE DAILY FOR FLARES AND DAILY FOR MAINTENANCE. (Patient not taking: Reported on 5/30/2025)      cyclobenzaprine (FLEXERIL) 10 MG tablet TAKE 1 TABLET BY MOUTH TWICE DAILY AS NEEDED FOR MUSCLE SPASMS (Patient not taking: Reported on 5/30/2025)      hydrocortisone (PROCTOSOL HC) 2.5 % CREA rectal cream APPLY A THIN LAYER TO THE AFFECTED AREA(S) BY TOPICAL ROUTE 2-4 TIMESDAILY      ibuprofen (ADVIL;MOTRIN) 200 MG tablet Take 1 tablet by mouth every 8 hours as needed      meloxicam (MOBIC) 15 MG tablet Take 1 tablet by mouth daily (Patient not taking: Reported on 5/30/2025)      triamcinolone (KENALOG) 0.1 % ointment APPLY A THIN LAYER TO THE AFFECTED AREA(S) BY TOPICAL ROUTE 2 TIMES PER DAY      naproxen (NAPROSYN) 250 MG tablet Take 1 tablet by mouth in the morning and 1 tablet in the evening. (Patient not taking: Reported on 5/30/2025)      tadalafil (CIALIS) 10 MG tablet Take 1 tablet by mouth daily 30 tablet 0    finasteride (PROSCAR) 5 MG tablet Take 1 tablet by mouth daily 30 tablet 2    NONFORMULARY Take 20 tablets by mouth daily Green Algae.      NONFORMULARY Take 3 tablets by mouth daily OMEGA OIL TABLETS      vitamin D (EQL VITAMIN D3) 125 MCG (5000 UT) CAPS capsule Take 1 capsule by mouth daily 90 capsule 3    cetirizine (ZYRTEC) 10 MG tablet Take 1 tablet by mouth daily       No current facility-administered medications for this visit.     Allergies   Allergen Reactions    Latex Rash    Meperidine Other (See Comments)     Burns, no analgesic effect    Penicillins Hives and Swelling     Social History     Socioeconomic History    Marital status: Single     Spouse name: Not on file    Number of children: Not on file    Years of education: Not on file    Highest education level: Not on file   Occupational History    Not on file   Tobacco Use    Smoking status: Every

## 2025-06-13 NOTE — PROGRESS NOTES
Urologic Oncology  [facility]  [address] [place] [id number]  [phone]          Ricardo Rooney III  : 1980      INITIAL EVALUATION    Chief Complaint   Patient presents with    New Patient       HPI: Ricardo Rooney III is a 45 y.o. male with a bladder mass.  Patient was referred by Dr. Mcdaniels.  He lives in [place] and is a month.  He complains of lower urinary tract symptoms primarily frequency urgency and incomplete emptying.  He currently is on Flomax and tadalafil 5 mg.  Dr. Mcdaniels performed cystoscopy and found a small papillary tumor at the bladder neck but the rest of the bladder appeared normal.    Patient denies any gross hematuria he has not had any urinary tract infections in the past 10 years.  He is not on any anticoagulation.  He is a non-smoker.  His IPSS is 24 with a quality-of-life score of 4.        PMH:     Past Medical History:   Diagnosis Date    Abdominal hernia     Right inguinal hernia repaired; newer left inguinal hernia (minor) still present    Acid reflux     Anxiety and depression     hx-- no meds at present    Back pain     Carpal tunnel syndrome of right wrist     Erectile dysfunction     External hemorrhoids     Herniated nucleus pulposus, L4-5 right     Herniated nucleus pulposus, L5-S1, left     Hidradenitis suppurativa     Hormone disorder Low T    Diagnosed in     Hypertriglyceridemia     Intervertebral disc disorder     Leg pain     from sciatica    Morbid obesity (HCC)     BMI = 42    Neuralgia of right inguinal region     Plantar fasciitis     Plantar fasciitis     bilat , right is worse    S/P inguinal hernia repair using synthetic patch     Sciatica     bilat    Skin tags, anus or rectum 2024    Excision of anal tags. Santana Bianchi Jr, MD 3/25/24    Smoker     Testosterone insufficiency        PSH:    Past Surgical History:   Procedure Laterality Date    ANUS SURGERY N/A 3/25/2024    EXCISION OF

## 2025-06-16 ENCOUNTER — PREP FOR PROCEDURE (OUTPATIENT)
Dept: ONCOLOGY | Age: 45
End: 2025-06-16

## 2025-06-16 ENCOUNTER — OFFICE VISIT (OUTPATIENT)
Age: 45
End: 2025-06-16
Payer: MEDICAID

## 2025-06-16 VITALS
OXYGEN SATURATION: 99 % | RESPIRATION RATE: 16 BRPM | HEIGHT: 71 IN | WEIGHT: 295 LBS | DIASTOLIC BLOOD PRESSURE: 77 MMHG | SYSTOLIC BLOOD PRESSURE: 114 MMHG | HEART RATE: 73 BPM | TEMPERATURE: 97.8 F | BODY MASS INDEX: 41.3 KG/M2

## 2025-06-16 DIAGNOSIS — N32.89 BLADDER MASS: Primary | ICD-10-CM

## 2025-06-16 DIAGNOSIS — N32.89 BLADDER MASS: ICD-10-CM

## 2025-06-16 PROCEDURE — 99214 OFFICE O/P EST MOD 30 MIN: CPT | Performed by: UROLOGY

## 2025-06-16 ASSESSMENT — PATIENT HEALTH QUESTIONNAIRE - PHQ9
SUM OF ALL RESPONSES TO PHQ QUESTIONS 1-9: 0
2. FEELING DOWN, DEPRESSED OR HOPELESS: NOT AT ALL
SUM OF ALL RESPONSES TO PHQ QUESTIONS 1-9: 0
1. LITTLE INTEREST OR PLEASURE IN DOING THINGS: NOT AT ALL

## 2025-06-16 ASSESSMENT — ENCOUNTER SYMPTOMS
GASTROINTESTINAL NEGATIVE: 1
RESPIRATORY NEGATIVE: 1

## 2025-06-16 NOTE — PROGRESS NOTES
Patient verified name and     Order for consent was not found in EHR and PT matchED case posting; patient verified.     Type 1B surgery, PHONE assessment complete.    Labs per surgeon: PENDING ORDERS  Labs per anesthesia protocol: NONE  EKG: NONE        Patient provided with and instructed on educational handouts including Guide to Surgery, Preventing Surgical Site Infections, Pain Management, and [facility] Brochure.    Patient answered medical/surgical history questions at their best of ability. All prior to admission medications documented in EPIC. Original medication prescription bottle was not visualized during patient appointment.     Patient instructed to hold all vitamins 7 days prior to surgery and NSAIDS 5 days prior to surgery, patient verbalized understanding.     Patient teach back successful and patient demonstrates knowledge of instructions.

## 2025-06-16 NOTE — PROGRESS NOTES
PLEASE CONTINUE TAKING ALL PRESCRIPTION MEDICATIONS UP TO THE DAY OF SURGERY UNLESS OTHERWISE DIRECTED BELOW. You may take Tylenol, allergy,  and/or indigestion medications.     TAKE ONLY THESE MEDICATIONS ON THE DAY OF SURGERY    zyrtec           DISCONTINUE all vitamins and supplements 7 days prior to surgery. DISCONTINUE Non-Steroidal Anti-Inflammatory (NSAIDS) such as Advil and Aleve 5 days prior to surgery.     Home Medications to Hold- please continue all other medications except these.    Cialis x 5 days-- all vitamins and supplements x 7 days   Marijuana stopped 6/16/25     Comments      On the day before surgery please take 2 Tylenol in the morning and then again before bed. You may use either regular or extra strength.           Please do not bring home medications with you on the day of surgery unless otherwise directed by your nurse.  If you are instructed to bring home medications, please give them to your nurse as they will be administered by the nursing staff.    If you have any questions, please call [facility] [phone].    A copy of this note was provided to the patient for reference.

## 2025-06-16 NOTE — PATIENT INSTRUCTIONS
Patient Information from Today's Visit    The members of your [facility]cology Medical Home are listed below:    Physician Provider: Ricardo Galicia, Urologic Oncologist  Advanced Practice Clinician: CAITLIN Bland  Medical Assistant: Mercedes CASTRO CMA  :Alma DE PAZ  Supportive Care Services: Aniyah MAO LMSW    Diagnosis (Information Sheet Provided on Day of Diagnosis): Bladder mass    Follow Up Instructions:   Records reviewed   We will plan to do a TURBT, if you need to do anything prior to the procedure please do not hesitate to call our office.      We discussed the natural history of bladder cancer, and the difference between muscle-invasive and non-muscle invasive urothelial cancer.  We discussed in detail the risks and benefits of transurethral resection of bladder tumor (TURBT).  We discussed the chance of bladder  perforation and need for open repair.  There is a small risk of damage to the urethra or ureteral orifices.  We discussed the risk of general anesthesia and other operative risks including but not limited to; MI, stroke, DVT/PE, bleeding, infection, pain/LUTS, tumor recurrence, other cardiovascular, pulmonary, neurologic, and renal complications.  A chirinos catheter will likely be left in place after the procedure.  All of the patient's questions were answered and they wish to proceed with TURBT. We also discussed that we may perform retrograde pyelograms at the time of the procedure.    Has Treatment Plan Been Finalized? No    Current Labs:   No visits with results within 3 Day(s) from this visit.   Latest known visit with results is:   Procedure visit on 06/12/2025   Component Date Value Ref Range Status    Glucose, Urine, POC 06/12/2025 Negative  Negative mg/dL Final    Bilirubin, Urine, POC 06/12/2025 Negative  Negative Final    KETONES, Urine, POC 06/12/2025 Negative  Negative mg/dL Final    Specific Gravity, Urine, POC 06/12/2025 1.010  1.001 - 1.035 Final    Blood (UA POC) 06/12/2025

## 2025-06-23 ENCOUNTER — ANESTHESIA EVENT (OUTPATIENT)
Dept: SURGERY | Age: 45
End: 2025-06-23
Payer: MEDICAID

## 2025-06-24 ENCOUNTER — ANESTHESIA (OUTPATIENT)
Dept: SURGERY | Age: 45
End: 2025-06-24
Payer: MEDICAID

## 2025-06-24 ENCOUNTER — HOSPITAL ENCOUNTER (OUTPATIENT)
Age: 45
Setting detail: OUTPATIENT SURGERY
Discharge: HOME | End: 2025-06-24
Attending: UROLOGY | Admitting: UROLOGY
Payer: MEDICAID

## 2025-06-24 VITALS
BODY MASS INDEX: 42.52 KG/M2 | DIASTOLIC BLOOD PRESSURE: 74 MMHG | HEIGHT: 70 IN | HEART RATE: 58 BPM | SYSTOLIC BLOOD PRESSURE: 135 MMHG | RESPIRATION RATE: 16 BRPM | TEMPERATURE: 98.1 F | OXYGEN SATURATION: 99 % | WEIGHT: 297 LBS

## 2025-06-24 PROCEDURE — 3700000001 HC ADD 15 MINUTES (ANESTHESIA): Performed by: UROLOGY

## 2025-06-24 PROCEDURE — 2580000003 HC RX 258: Performed by: ANESTHESIOLOGY

## 2025-06-24 PROCEDURE — 7100000011 HC PHASE II RECOVERY - ADDTL 15 MIN: Performed by: UROLOGY

## 2025-06-24 PROCEDURE — 2720000010 HC SURG SUPPLY STERILE: Performed by: UROLOGY

## 2025-06-24 PROCEDURE — 3700000000 HC ANESTHESIA ATTENDED CARE: Performed by: UROLOGY

## 2025-06-24 PROCEDURE — 7100000010 HC PHASE II RECOVERY - FIRST 15 MIN: Performed by: UROLOGY

## 2025-06-24 PROCEDURE — 88307 TISSUE EXAM BY PATHOLOGIST: CPT

## 2025-06-24 PROCEDURE — 2709999900 HC NON-CHARGEABLE SUPPLY: Performed by: UROLOGY

## 2025-06-24 PROCEDURE — 6360000002 HC RX W HCPCS: Performed by: ANESTHESIOLOGY

## 2025-06-24 PROCEDURE — 6360000002 HC RX W HCPCS

## 2025-06-24 PROCEDURE — 7100000000 HC PACU RECOVERY - FIRST 15 MIN: Performed by: UROLOGY

## 2025-06-24 PROCEDURE — 3600000014 HC SURGERY LEVEL 4 ADDTL 15MIN: Performed by: UROLOGY

## 2025-06-24 PROCEDURE — 3600000004 HC SURGERY LEVEL 4 BASE: Performed by: UROLOGY

## 2025-06-24 PROCEDURE — 7100000001 HC PACU RECOVERY - ADDTL 15 MIN: Performed by: UROLOGY

## 2025-06-24 PROCEDURE — 6360000002 HC RX W HCPCS: Performed by: PHYSICIAN ASSISTANT

## 2025-06-24 PROCEDURE — 6370000000 HC RX 637 (ALT 250 FOR IP): Performed by: ANESTHESIOLOGY

## 2025-06-24 PROCEDURE — 2500000003 HC RX 250 WO HCPCS

## 2025-06-24 PROCEDURE — 52234 CYSTOSCOPY AND TREATMENT: CPT | Performed by: UROLOGY

## 2025-06-24 RX ORDER — SODIUM CHLORIDE 0.9 % (FLUSH) 0.9 %
5-40 SYRINGE (ML) INJECTION PRN
Status: DISCONTINUED | OUTPATIENT
Start: 2025-06-24 | End: 2025-06-24 | Stop reason: HOSPADM

## 2025-06-24 RX ORDER — FENTANYL CITRATE 50 UG/ML
INJECTION, SOLUTION INTRAMUSCULAR; INTRAVENOUS
Status: DISCONTINUED | OUTPATIENT
Start: 2025-06-24 | End: 2025-06-24 | Stop reason: SDUPTHER

## 2025-06-24 RX ORDER — LIDOCAINE HYDROCHLORIDE 20 MG/ML
INJECTION, SOLUTION EPIDURAL; INFILTRATION; INTRACAUDAL; PERINEURAL
Status: DISCONTINUED | OUTPATIENT
Start: 2025-06-24 | End: 2025-06-24 | Stop reason: SDUPTHER

## 2025-06-24 RX ORDER — LIDOCAINE HYDROCHLORIDE 10 MG/ML
1 INJECTION, SOLUTION INFILTRATION; PERINEURAL
Status: DISCONTINUED | OUTPATIENT
Start: 2025-06-24 | End: 2025-06-24 | Stop reason: HOSPADM

## 2025-06-24 RX ORDER — PROPOFOL 10 MG/ML
INJECTION, EMULSION INTRAVENOUS
Status: DISCONTINUED | OUTPATIENT
Start: 2025-06-24 | End: 2025-06-24 | Stop reason: SDUPTHER

## 2025-06-24 RX ORDER — ONDANSETRON 2 MG/ML
4 INJECTION INTRAMUSCULAR; INTRAVENOUS
Status: DISCONTINUED | OUTPATIENT
Start: 2025-06-24 | End: 2025-06-24 | Stop reason: HOSPADM

## 2025-06-24 RX ORDER — ROCURONIUM BROMIDE 10 MG/ML
INJECTION, SOLUTION INTRAVENOUS
Status: DISCONTINUED | OUTPATIENT
Start: 2025-06-24 | End: 2025-06-24 | Stop reason: SDUPTHER

## 2025-06-24 RX ORDER — NALOXONE HYDROCHLORIDE 0.4 MG/ML
INJECTION, SOLUTION INTRAMUSCULAR; INTRAVENOUS; SUBCUTANEOUS PRN
Status: DISCONTINUED | OUTPATIENT
Start: 2025-06-24 | End: 2025-06-24 | Stop reason: HOSPADM

## 2025-06-24 RX ORDER — SUCCINYLCHOLINE/SOD CL,ISO/PF 200MG/10ML
SYRINGE (ML) INTRAVENOUS
Status: DISCONTINUED | OUTPATIENT
Start: 2025-06-24 | End: 2025-06-24 | Stop reason: SDUPTHER

## 2025-06-24 RX ORDER — SODIUM CHLORIDE, SODIUM LACTATE, POTASSIUM CHLORIDE, CALCIUM CHLORIDE 600; 310; 30; 20 MG/100ML; MG/100ML; MG/100ML; MG/100ML
INJECTION, SOLUTION INTRAVENOUS CONTINUOUS
Status: DISCONTINUED | OUTPATIENT
Start: 2025-06-24 | End: 2025-06-24 | Stop reason: HOSPADM

## 2025-06-24 RX ORDER — DEXAMETHASONE SODIUM PHOSPHATE 4 MG/ML
INJECTION, SOLUTION INTRA-ARTICULAR; INTRALESIONAL; INTRAMUSCULAR; INTRAVENOUS; SOFT TISSUE
Status: DISCONTINUED | OUTPATIENT
Start: 2025-06-24 | End: 2025-06-24 | Stop reason: SDUPTHER

## 2025-06-24 RX ORDER — OXYCODONE HYDROCHLORIDE 5 MG/1
5 TABLET ORAL
Status: COMPLETED | OUTPATIENT
Start: 2025-06-24 | End: 2025-06-24

## 2025-06-24 RX ORDER — MIDAZOLAM HYDROCHLORIDE 2 MG/2ML
2 INJECTION, SOLUTION INTRAMUSCULAR; INTRAVENOUS
Status: DISCONTINUED | OUTPATIENT
Start: 2025-06-24 | End: 2025-06-24 | Stop reason: HOSPADM

## 2025-06-24 RX ORDER — MIDAZOLAM HYDROCHLORIDE 1 MG/ML
INJECTION, SOLUTION INTRAMUSCULAR; INTRAVENOUS
Status: DISCONTINUED | OUTPATIENT
Start: 2025-06-24 | End: 2025-06-24 | Stop reason: SDUPTHER

## 2025-06-24 RX ORDER — LABETALOL HYDROCHLORIDE 5 MG/ML
10 INJECTION, SOLUTION INTRAVENOUS
Status: DISCONTINUED | OUTPATIENT
Start: 2025-06-24 | End: 2025-06-24 | Stop reason: HOSPADM

## 2025-06-24 RX ORDER — PROCHLORPERAZINE EDISYLATE 5 MG/ML
5 INJECTION INTRAMUSCULAR; INTRAVENOUS
Status: DISCONTINUED | OUTPATIENT
Start: 2025-06-24 | End: 2025-06-24 | Stop reason: HOSPADM

## 2025-06-24 RX ORDER — SODIUM CHLORIDE 0.9 % (FLUSH) 0.9 %
5-40 SYRINGE (ML) INJECTION EVERY 12 HOURS SCHEDULED
Status: DISCONTINUED | OUTPATIENT
Start: 2025-06-24 | End: 2025-06-24 | Stop reason: HOSPADM

## 2025-06-24 RX ORDER — ACETAMINOPHEN 500 MG
1000 TABLET ORAL ONCE
Status: COMPLETED | OUTPATIENT
Start: 2025-06-24 | End: 2025-06-24

## 2025-06-24 RX ORDER — SODIUM CHLORIDE 9 MG/ML
INJECTION, SOLUTION INTRAVENOUS PRN
Status: DISCONTINUED | OUTPATIENT
Start: 2025-06-24 | End: 2025-06-24 | Stop reason: HOSPADM

## 2025-06-24 RX ORDER — ONDANSETRON 2 MG/ML
INJECTION INTRAMUSCULAR; INTRAVENOUS
Status: DISCONTINUED | OUTPATIENT
Start: 2025-06-24 | End: 2025-06-24 | Stop reason: SDUPTHER

## 2025-06-24 RX ORDER — HYDRALAZINE HYDROCHLORIDE 20 MG/ML
10 INJECTION INTRAMUSCULAR; INTRAVENOUS
Status: DISCONTINUED | OUTPATIENT
Start: 2025-06-24 | End: 2025-06-24 | Stop reason: HOSPADM

## 2025-06-24 RX ORDER — SULFAMETHOXAZOLE AND TRIMETHOPRIM 800; 160 MG/1; MG/1
1 TABLET ORAL 2 TIMES DAILY
Qty: 4 TABLET | Refills: 0 | Status: SHIPPED | OUTPATIENT
Start: 2025-06-25 | End: 2025-06-27

## 2025-06-24 RX ORDER — FENTANYL CITRATE 50 UG/ML
100 INJECTION, SOLUTION INTRAMUSCULAR; INTRAVENOUS
Status: DISCONTINUED | OUTPATIENT
Start: 2025-06-24 | End: 2025-06-24 | Stop reason: HOSPADM

## 2025-06-24 RX ADMIN — HYDROMORPHONE HYDROCHLORIDE 0.25 MG: 1 INJECTION, SOLUTION INTRAMUSCULAR; INTRAVENOUS; SUBCUTANEOUS at 11:32

## 2025-06-24 RX ADMIN — SODIUM CHLORIDE, SODIUM LACTATE, POTASSIUM CHLORIDE, AND CALCIUM CHLORIDE: .6; .31; .03; .02 INJECTION, SOLUTION INTRAVENOUS at 09:30

## 2025-06-24 RX ADMIN — FENTANYL CITRATE 100 MCG: 50 INJECTION, SOLUTION INTRAMUSCULAR; INTRAVENOUS at 10:16

## 2025-06-24 RX ADMIN — MIDAZOLAM 2 MG: 1 INJECTION INTRAMUSCULAR; INTRAVENOUS at 10:11

## 2025-06-24 RX ADMIN — ACETAMINOPHEN 1000 MG: 500 TABLET, FILM COATED ORAL at 09:09

## 2025-06-24 RX ADMIN — PROPOFOL 80 MG: 10 INJECTION, EMULSION INTRAVENOUS at 10:28

## 2025-06-24 RX ADMIN — Medication 3000 MG: at 10:27

## 2025-06-24 RX ADMIN — Medication 200 MG: at 10:16

## 2025-06-24 RX ADMIN — FENTANYL CITRATE 50 MCG: 50 INJECTION, SOLUTION INTRAMUSCULAR; INTRAVENOUS at 10:39

## 2025-06-24 RX ADMIN — LIDOCAINE HYDROCHLORIDE 100 MG: 20 INJECTION, SOLUTION EPIDURAL; INFILTRATION; INTRACAUDAL; PERINEURAL at 10:16

## 2025-06-24 RX ADMIN — PROPOFOL 200 MG: 10 INJECTION, EMULSION INTRAVENOUS at 10:16

## 2025-06-24 RX ADMIN — ROCURONIUM BROMIDE 5 MG: 10 INJECTION, SOLUTION INTRAVENOUS at 10:16

## 2025-06-24 RX ADMIN — DEXAMETHASONE SODIUM PHOSPHATE 4 MG: 4 INJECTION INTRA-ARTICULAR; INTRALESIONAL; INTRAMUSCULAR; INTRAVENOUS; SOFT TISSUE at 10:28

## 2025-06-24 RX ADMIN — FENTANYL CITRATE 50 MCG: 50 INJECTION, SOLUTION INTRAMUSCULAR; INTRAVENOUS at 10:30

## 2025-06-24 RX ADMIN — OXYCODONE 5 MG: 5 TABLET ORAL at 12:16

## 2025-06-24 RX ADMIN — HYDROMORPHONE HYDROCHLORIDE 0.25 MG: 1 INJECTION, SOLUTION INTRAMUSCULAR; INTRAVENOUS; SUBCUTANEOUS at 11:13

## 2025-06-24 RX ADMIN — ONDANSETRON 4 MG: 2 INJECTION, SOLUTION INTRAMUSCULAR; INTRAVENOUS at 10:28

## 2025-06-24 ASSESSMENT — PAIN DESCRIPTION - DESCRIPTORS
DESCRIPTORS: BURNING;DISCOMFORT
DESCRIPTORS: ACHING;BURNING

## 2025-06-24 ASSESSMENT — PAIN DESCRIPTION - ORIENTATION
ORIENTATION: MID
ORIENTATION: ANTERIOR

## 2025-06-24 ASSESSMENT — PAIN SCALES - GENERAL
PAINLEVEL_OUTOF10: 6
PAINLEVEL_OUTOF10: 4
PAINLEVEL_OUTOF10: 6
PAINLEVEL_OUTOF10: 0
PAINLEVEL_OUTOF10: 6

## 2025-06-24 ASSESSMENT — LIFESTYLE VARIABLES: SMOKING_STATUS: 1

## 2025-06-24 ASSESSMENT — PAIN - FUNCTIONAL ASSESSMENT: PAIN_FUNCTIONAL_ASSESSMENT: 0-10

## 2025-06-24 ASSESSMENT — PAIN DESCRIPTION - LOCATION
LOCATION: PENIS
LOCATION: PENIS

## 2025-06-24 NOTE — DISCHARGE INSTRUCTIONS
My office will schedule your follow-up appointment.    Please call our office ([phone]) or return to ED if you experience fever > 101.5, severe pain, persistent nausea/vomiting, excessive bleeding, inability to urinate, or other concerns.     If you have had surgery in the past 7-10 days by one of our providers and are having fever, bleeding, or drainage from an incision, have an opening in an incision, or having issues urinating properly, please call [phone].    If you experience urinary retention following discharge, please return to ER    Transurethral Resection for Bladder    You have had a transurethral resection (TUR) of the bladder.  You may have a small tube called a catheter in your urethra to help stop bleeding and to prevent blockage of the urethra. When the bleeding has stopped, the tube is removed.   You may feel the need to urinate frequently for a while after the surgery, but this should improve with time. It may burn when you urinate. Drink lots of fluids to help with the burning. Your urine also may look pink for up to 2 to 3 weeks after surgery. This is because there may be blood in it.  You may have to avoid strenuous activity and heavy lifting for about 3 weeks after TUR.  This care sheet gives you a general idea about how long it will take for you to recover. But each person recovers at a different pace. Follow the steps below to feel better as quickly as possible.  How can you care for yourself at home?  Activity  Rest when you feel tired. Getting enough sleep will help you recover.  Try to walk each day. Start by walking a little more than you did the day before. Bit by bit, increase the amount you walk. Walking boosts blood flow and helps prevent pneumonia and constipation.  Please limit your activities for 3 days.  Avoid strenuous activities, such as bicycle riding, jogging, weight lifting, or aerobic exercise, for about 3 weeks, or until your doctor says it is okay.  For about 3

## 2025-06-24 NOTE — BRIEF OP NOTE
Brief Postoperative Note      Patient: Ricardo Rooney III  YOB: 1980  MRN: [id number]    Date of Procedure: 6/24/2025    Pre-Op Diagnosis Codes:      * Bladder mass [N32.89]    Post-Op Diagnosis: Same       Procedure(s):  CYSTOSCOPY TRANSURETHRAL RESECTION BLADDER    Surgeon(s):  Ricardo Galicia MD    Assistant:  * No surgical staff found *    Anesthesia: General    Estimated Blood Loss (mL): Minimal    Complications: None    Specimens:   * No specimens in log *    Implants:  * No implants in log *      Drains: * No LDAs found *    Findings:  Infection Present At Time Of Surgery (PATOS) (choose all levels that have infection present):  No infection present  Other Findings: Patient has an approximately 1 cm frondular appearing papillary mass right at the bladder neck.  It is coming from the posterior bladder neck just to the left of midline at approximately the 4 to 5 o'clock position.  It was resected completely with a small portion of prostate as well.  Rest the bladder was carefully surveyed and other than having mild trabeculations and a few shallow diverticuli there were no mucosal abnormalities or concerns for other bladder lesions.  Kasper catheter placed and can be removed tomorrow more    Electronically signed by Ricardo Galicia MD on 6/24/2025 at 10:44 AM

## 2025-06-24 NOTE — ANESTHESIA PRE PROCEDURE
Department of Anesthesiology  Preprocedure Note       Name:  Ricardo Rooney III   Age:  45 y.o.  :  1980                                          MRN:  [id number]         Date:  2025      Surgeon: Surgeon(s):  Ricardo Galicia MD    Procedure: Procedure(s):  CYSTOSCOPY TRANSURETHRAL RESECTION BLADDER    Medications prior to admission:   Prior to Admission medications    Medication Sig Start Date End Date Taking? Authorizing Provider   tamsulosin (FLOMAX) 0.4 MG capsule Take 1 capsule by mouth every evening 25  Yes Kg Mcdaniels MD   clindamycin (CLEOCIN T) 1 % lotion    Yes ProviderRaulito MD   hydrocortisone (PROCTOSOL HC) 2.5 % CREA rectal cream APPLY A THIN LAYER TO THE AFFECTED AREA(S) BY TOPICAL ROUTE 2-4 TIMESDAILY 2/15/24  Yes Raulito Walker MD   ibuprofen (ADVIL;MOTRIN) 200 MG tablet Take 1 tablet by mouth every 8 hours as needed   Yes Raulito Walker MD   triamcinolone (KENALOG) 0.1 % ointment APPLY A THIN LAYER TO THE AFFECTED AREA(S) BY TOPICAL ROUTE 2 TIMES PER DAY 24  Yes Raulito Walker MD   tadalafil (CIALIS) 10 MG tablet Take 1 tablet by mouth daily 25 Yes Antonio Wolff APRN - CNP   NONFORMULARY Take 20 tablets by mouth daily Green Algae.   Yes Raulito Walker MD   NONFORMULARY Take 3 tablets by mouth daily OMEGA OIL TABLETS   Yes Raulito Walker MD   vitamin D (EQL VITAMIN D3) 125 MCG (5000 UT) CAPS capsule Take 1 capsule by mouth daily 25 Yes Antonio Wolff APRN - CNP   cetirizine (ZYRTEC) 10 MG tablet Take 1 tablet by mouth daily   Yes Raulito Walker MD   cyclobenzaprine (FLEXERIL) 10 MG tablet TAKE 1 TABLET BY MOUTH TWICE DAILY AS NEEDED FOR MUSCLE SPASMS  Patient not taking: Reported on 2025    Raulito Walker MD   meloxicam (MOBIC) 15 MG tablet Take 1 tablet by mouth daily  Patient not taking: Reported on 2025    Raulito Walker MD   naproxen (NAPROSYN) 250 MG

## 2025-06-24 NOTE — PERIOP NOTE
Dr. Galicia notified of pt's discomfort related to chirinos catheter. Per MD, if pt unable to tolerate catheter, catheter may be removed and pt must void before being discharged.     Pt updated on plan and educated on risk of urinary retention after discharge if catheter were to be removed. Pt advised to return to ER if experiencing urinary retention following discharge.

## 2025-06-24 NOTE — ANESTHESIA POSTPROCEDURE EVALUATION
Department of Anesthesiology  Postprocedure Note    Patient: Ricardo Rooney III  MRN: [id number]  YOB: 1980  Date of evaluation: 6/24/2025    Procedure Summary       Date: 06/24/25 Room / Location: [facility] MAIN OR 02 / [facility] MAIN OR    Anesthesia Start: 1006 Anesthesia Stop: 1056    Procedure: CYSTOSCOPY TRANSURETHRAL RESECTION BLADDER (Bladder) Diagnosis:       Bladder mass      (Bladder mass [N32.89])    Providers: Ricardo Galicia MD Responsible Provider: Kg Lucas DO    Anesthesia Type: general ASA Status: 3            Anesthesia Type: No value filed.    Clay Phase I: Clay Score: 10    Clay Phase II: Clay Score: 10    Anesthesia Post Evaluation    Patient location during evaluation: PACU  Level of consciousness: awake and alert  Airway patency: patent  Nausea & Vomiting: no nausea  Cardiovascular status: hemodynamically stable  Respiratory status: acceptable  Hydration status: euvolemic  Comments: Blood pressure 135/74, pulse 58, temperature 98.1 °F (36.7 °C), temperature source Infrared, resp. rate 16, height 1.778 m (5' 10\"), weight 134.7 kg (297 lb), SpO2 99%.  Pain management: satisfactory to patient    No notable events documented.

## 2025-06-24 NOTE — OP NOTE
[address]  [address]                            OPERATIVE REPORT      PATIENT NAME: ALBERTO HERNANDEZ      : 1980  MED REC NO: [id number]                       [place] [facility]  [place] NO: [id number]                       ADMIT DATE: 2025  PROVIDER: Alberto Galicia MD    DATE OF SERVICE:  2025    PREOPERATIVE DIAGNOSES:  Bladder neck mass.    POSTOPERATIVE DIAGNOSES:  Bladder neck mass.    PROCEDURES PERFORMED:  Transurethral resection of bladder tumor (small).    SURGEON:  Alberto Galicia MD    ASSISTANT:  None.    ANESTHESIA:  General with endotracheal tube.    ESTIMATED BLOOD LOSS:  Minimal.    SPECIMENS REMOVED:  Bladder tumor, posterior bladder neck.    INTRAOPERATIVE FINDINGS:  The patient had an approximately 1 cm frondular appearing papillary mass at the bladder neck.  It was located on the posterior bladder neck just to the patient's left of midline at approximately the 4 or 5 o'clock position.  I resected it completely with a small portion of underlying prostate.  Rest of the bladder was normal appearing except for some mild-to-moderate trabeculations.     COMPLICATIONS:  None.    IMPLANTS:  None.    INDICATIONS:  As above.    DESCRIPTION OF PROCEDURE:  After informed consent was obtained, the patient was taken to the operating room 2 at [facility].  After induction of general anesthesia and placement of endotracheal tube, he was carefully positioned in low lithotomy position.  Genitalia prepped and draped in sterile fashion.  Time-out was performed.  He was given Ancef as prophylactic antibiotic.  I then carefully inserted a continuous-flow 26-Slovak resectoscope using the visual obturator.  The entire urethra appeared normal.  Upon entering the prostatic urethra, you could easily visualize the above papillary appearing 1 cm tumor at the posterior bladder neck.  I then carefully examined

## 2025-07-08 NOTE — PROGRESS NOTES
Toribio RAMIREZ is a 45 y.o. male with a diagnosis of proliferative cystitis.  We discussed that his biopsy was negative for malignancy.  I still do not have a great explanation for his ongoing lower urinary tract symptoms.  He will continue his Flomax and Cialis.  We talked about the potential of a prostate procedure in the future.  We discussed possible UroLift.  He will plan to see us back in 3 to 4 months with a repeat IPSS.  If his symptoms are still bothersome he may consider 1 of those procedures at that time.        PLAN:   -Pathology reviewed   -IPSS deferred today  -Continue the Flomax 0.4 mg  -Discussion of a UroLift if urine symptoms persist  -RTC in 3-4 months w/ IPSS   ________________________________________      I have seen and examined this patient.  I have reviewed and edited the note started by the MA and agree with the outlined plan.  Part of this note was written by using a voice dictation software. The note has been proof read but may still contain some grammatical/other typographical errors.      Markel Galicia MD  Urologic Oncology  [facility]

## 2025-07-14 ENCOUNTER — OFFICE VISIT (OUTPATIENT)
Age: 45
End: 2025-07-14

## 2025-07-14 ENCOUNTER — TELEPHONE (OUTPATIENT)
Dept: ONCOLOGY | Age: 45
End: 2025-07-14

## 2025-07-14 VITALS
HEIGHT: 70 IN | DIASTOLIC BLOOD PRESSURE: 81 MMHG | RESPIRATION RATE: 17 BRPM | HEART RATE: 73 BPM | SYSTOLIC BLOOD PRESSURE: 135 MMHG | OXYGEN SATURATION: 99 % | WEIGHT: 302.2 LBS | BODY MASS INDEX: 43.26 KG/M2

## 2025-07-14 DIAGNOSIS — N30.80: Primary | ICD-10-CM

## 2025-07-14 ASSESSMENT — ENCOUNTER SYMPTOMS
RESPIRATORY NEGATIVE: 1
GASTROINTESTINAL NEGATIVE: 1

## 2025-07-14 ASSESSMENT — PATIENT HEALTH QUESTIONNAIRE - PHQ9
SUM OF ALL RESPONSES TO PHQ QUESTIONS 1-9: 0
1. LITTLE INTEREST OR PLEASURE IN DOING THINGS: NOT AT ALL
2. FEELING DOWN, DEPRESSED OR HOPELESS: NOT AT ALL

## 2025-07-14 NOTE — PATIENT INSTRUCTIONS
Please notify your assigned Nurse Navigator of any unplanned hospital admissions or Emergency Room visits within 24 hours of discharge.    -------------------------------------------------------------------------------------------------------------------  Please call our office at [phone] if you have any  of the following symptoms:   Fever of 100.5 or greater  Chills  Shortness of breath  Swelling or pain in one leg    After office hours an answering service is available and will contact a provider for emergencies or if you are experiencing any of the above symptoms.        Mercedes Duke MA

## 2025-07-14 NOTE — TELEPHONE ENCOUNTER
Physician provider: Dr. Galicia  Reason for today's call (Please detail here patients chief complaint): Cancel appt if results are no cancer was found    Last office visit:na  Patient Callback Number: [phone]  Was callback number verified?: Yes  Preferred pharmacy (If refill request): na  Veriified that patient confirmed no refills left at pharmacy? :No  Has the patient called the office for this concern within the last 48 hours?:No    Red Word Mentioned  Warm Transfer Phone Call to (Name): na    Patient notified that their information will be routed to the appropriate team for review. Patient is advised that they will receive a phone call from the appropriate department. If awaiting a call from the triage department and symptoms worsen before receiving a call back, the patient has been advised to proceed to the nearest ED.      Pt wld like to cancel today's appt if all they will discuss is benign results. Pt states he would rather just go back to his regular Urologist and continue care there if his test results show he is cancer free.

## 2025-07-21 ENCOUNTER — APPOINTMENT (OUTPATIENT)
Dept: URBAN - METROPOLITAN AREA CLINIC 25 | Facility: CLINIC | Age: 45
Setting detail: DERMATOLOGY
End: 2025-07-21

## 2025-07-21 DIAGNOSIS — L81.4 OTHER MELANIN HYPERPIGMENTATION: ICD-10-CM

## 2025-07-21 DIAGNOSIS — Z71.89 OTHER SPECIFIED COUNSELING: ICD-10-CM

## 2025-07-21 DIAGNOSIS — D18.0 HEMANGIOMA: ICD-10-CM

## 2025-07-21 DIAGNOSIS — L82.1 OTHER SEBORRHEIC KERATOSIS: ICD-10-CM

## 2025-07-21 DIAGNOSIS — D22 MELANOCYTIC NEVI: ICD-10-CM

## 2025-07-21 DIAGNOSIS — L57.8 OTHER SKIN CHANGES DUE TO CHRONIC EXPOSURE TO NONIONIZING RADIATION: ICD-10-CM

## 2025-07-21 DIAGNOSIS — L72.8 OTHER FOLLICULAR CYSTS OF THE SKIN AND SUBCUTANEOUS TISSUE: ICD-10-CM

## 2025-07-21 DIAGNOSIS — L91.8 OTHER HYPERTROPHIC DISORDERS OF THE SKIN: ICD-10-CM

## 2025-07-21 DIAGNOSIS — L73.2 HIDRADENITIS SUPPURATIVA: ICD-10-CM

## 2025-07-21 PROBLEM — D22.5 MELANOCYTIC NEVI OF TRUNK: Status: ACTIVE | Noted: 2025-07-21

## 2025-07-21 PROBLEM — D18.01 HEMANGIOMA OF SKIN AND SUBCUTANEOUS TISSUE: Status: ACTIVE | Noted: 2025-07-21

## 2025-07-21 PROCEDURE — ? ADDITIONAL NOTES

## 2025-07-21 PROCEDURE — ? DEFER

## 2025-07-21 PROCEDURE — ? PRESCRIPTION MEDICATION MANAGEMENT

## 2025-07-21 PROCEDURE — ? COUNSELING

## 2025-07-21 PROCEDURE — ? PRESCRIPTION

## 2025-07-21 RX ORDER — DOXYCYCLINE HYCLATE 100 MG/1
CAPSULE, GELATIN COATED ORAL
Qty: 60 | Refills: 0 | Status: ERX | COMMUNITY
Start: 2025-07-21

## 2025-07-21 RX ORDER — CLINDAMYCIN PHOSPHATE 10 MG/G
GEL TOPICAL
Qty: 60 | Refills: 11 | Status: ERX | COMMUNITY
Start: 2025-07-21

## 2025-07-21 RX ADMIN — DOXYCYCLINE HYCLATE: 100 CAPSULE, GELATIN COATED ORAL at 00:00

## 2025-07-21 RX ADMIN — CLINDAMYCIN PHOSPHATE: 10 GEL TOPICAL at 00:00

## 2025-07-21 ASSESSMENT — LOCATION DETAILED DESCRIPTION DERM
LOCATION DETAILED: LEFT ANTERIOR PROXIMAL THIGH
LOCATION DETAILED: LEFT CENTRAL MALAR CHEEK
LOCATION DETAILED: RIGHT BUTTOCK
LOCATION DETAILED: RIGHT ANTERIOR PROXIMAL THIGH
LOCATION DETAILED: SUPERIOR THORACIC SPINE
LOCATION DETAILED: RIGHT CENTRAL MALAR CHEEK
LOCATION DETAILED: PERIUMBILICAL SKIN
LOCATION DETAILED: STERNAL NOTCH
LOCATION DETAILED: INFERIOR THORACIC SPINE
LOCATION DETAILED: RIGHT AXILLARY VAULT
LOCATION DETAILED: RIGHT INGUINAL CREASE
LOCATION DETAILED: LEFT AXILLARY VAULT

## 2025-07-21 ASSESSMENT — LOCATION SIMPLE DESCRIPTION DERM
LOCATION SIMPLE: UPPER BACK
LOCATION SIMPLE: ABDOMEN
LOCATION SIMPLE: RIGHT THIGH
LOCATION SIMPLE: RIGHT CHEEK
LOCATION SIMPLE: RIGHT AXILLARY VAULT
LOCATION SIMPLE: RIGHT BUTTOCK
LOCATION SIMPLE: LEFT AXILLARY VAULT
LOCATION SIMPLE: LEFT THIGH
LOCATION SIMPLE: GROIN
LOCATION SIMPLE: CHEST
LOCATION SIMPLE: LEFT CHEEK

## 2025-07-21 ASSESSMENT — LOCATION ZONE DERM
LOCATION ZONE: TRUNK
LOCATION ZONE: LEG
LOCATION ZONE: AXILLAE
LOCATION ZONE: FACE

## 2025-07-21 NOTE — PROCEDURE: DEFER
Instructions (Optional): ILK if needed
Detail Level: Detailed
Introduction Text (Please End With A Colon): The following procedure was deferred:
Size Of Lesion In Cm (Optional): 0

## 2025-07-21 NOTE — HPI: CYST
How Severe Is Your Cyst?: mild
Is This A New Presentation, Or A Follow-Up?: Cysts
Additional History: States cystic bumps cycle consistently. They will fill up, he will express them then they return

## 2025-07-21 NOTE — PROCEDURE: ADDITIONAL NOTES
Additional Notes: Pt educated, tx is cosmetic. Quoted $158 for up to 5 and $236 for up to 10.
Detail Level: Simple
Render Risk Assessment In Note?: no

## 2025-07-21 NOTE — PROCEDURE: PRESCRIPTION MEDICATION MANAGEMENT
Continue Regimen: Hibiclens\\nBPO Cleanser
Detail Level: Zone
Initiate Treatment: Clindamycin 1 % topical gel (Apply a thin layer to AA of bumps in skin folds twice daily)\\n\\nDoxycycline hyclate 100 mg capsule (Take one pill PO twice daily. Take with large meal and full glass of water.)\\n\\nSulphur Cleanser\\n\\nSkin Smart (Hypochlorous Acid spray)
Plan: ILK injections PRN flares\\n\\nHas tried accutane in the past, HS recurred. Reviewed biologics as well, but are advanced therapies and the severity is not to that point at this time
Render In Strict Bullet Format?: No

## 2025-07-28 ENCOUNTER — RX ONLY (RX ONLY)
Age: 45
End: 2025-07-28

## 2025-07-28 RX ORDER — DOXYCYCLINE HYCLATE 100 MG/1
CAPSULE, GELATIN COATED ORAL
Qty: 60 | Refills: 0 | Status: ERX

## (undated) DEVICE — SOLUTION IRRIG 1000ML 0.9% SOD CHL USP POUR PLAS BTL

## (undated) DEVICE — ELECTRODE,CUTTING,STERILE.24FR: Brand: N.A.

## (undated) DEVICE — SINGLE PORT MANIFOLD: Brand: NEPTUNE 2

## (undated) DEVICE — GLOVE SURG SZ 75 L12IN FNGR THK79MIL GRN LTX FREE

## (undated) DEVICE — Y-TYPE TUR/BLADDER IRRIGATION SET, REGULATING CLAMP

## (undated) DEVICE — APPLICATOR MEDICATED 26 CC SOLUTION HI LT ORNG CHLORAPREP

## (undated) DEVICE — SYRINGE MED 30ML STD CLR PLAS LUERLOCK TIP N CTRL DISP

## (undated) DEVICE — SOLUTION IRRIG 3000ML H2O STRL BAG

## (undated) DEVICE — BLOCK BITE AD 60FR W/ VELC STRP ADDRESSES MOST PT AND

## (undated) DEVICE — CONNECTOR TBNG OD5-7MM O2 END DISP

## (undated) DEVICE — SYRINGE MEDICAL 3ML CLEAR PLASTIC STANDARD NON CONTROL LUERLOCK TIP DISPOSABLE

## (undated) DEVICE — PAD,NON-ADHERENT,3X8,STERILE,LF,1/PK: Brand: MEDLINE

## (undated) DEVICE — GOWN,REINFORCED,POLY,AURORA,XXLARGE,STR: Brand: MEDLINE

## (undated) DEVICE — BAG DRAIN UROLOGY GENESIS NS

## (undated) DEVICE — PREMIUM WET SKIN PREP TRAY: Brand: MEDLINE INDUSTRIES, INC.

## (undated) DEVICE — CONTAINER FORMALIN PREFILLED 10% NBF 60ML

## (undated) DEVICE — LUBE JELLY FOIL PACK 1.4 OZ: Brand: MEDLINE INDUSTRIES, INC.

## (undated) DEVICE — NEEDLE SYRINGE 18GA L1.5IN RED PLAS HUB S STL BLNT FILL W/O

## (undated) DEVICE — GUIDEWIRE WITH SPRING TIP - SINGLE USE: Brand: SAFEGUIDE®

## (undated) DEVICE — MOUTHPIECE ENDOSCP L CTRL OPN AND SIDE PORTS DISP

## (undated) DEVICE — KENDALL RADIOLUCENT FOAM MONITORING ELECTRODE RECTANGULAR SHAPE: Brand: KENDALL

## (undated) DEVICE — GAUZE,SPONGE,4"X4",12PLY,WOVEN,NS,LF: Brand: MEDLINE

## (undated) DEVICE — BAG,DRAINAGE,4L,A/R TOWER,LL,SLIDE TAP: Brand: MEDLINE

## (undated) DEVICE — NEEDLE HYPO 21GA L1.5IN INTRAMUSCULAR S STL LATCH BVL UP

## (undated) DEVICE — SUTURE VCRL SZ 2-0 L27IN ABSRB UD L26MM SH 1/2 CIR J417H

## (undated) DEVICE — GARMENT,MEDLINE,DVT,INT,CALF,MED, GEN2: Brand: MEDLINE

## (undated) DEVICE — MINOR SPLIT GENERAL: Brand: MEDLINE INDUSTRIES, INC.

## (undated) DEVICE — ELECTRODE PT RET AD L9FT HI MOIST COND ADH HYDRGEL CORDED

## (undated) DEVICE — FORCEPS BX L240CM JAW DIA2.8MM L CAP W/ NDL MIC MESH TOOTH

## (undated) DEVICE — SYRINGE MED 10ML LUERLOCK TIP W/O SFTY DISP

## (undated) DEVICE — CATHETER URETH 20FR BLLN 5CC SIL F INDWL 2 W SHT LEN STD

## (undated) DEVICE — SOLUTION IRRIG 1000ML H2O PIC PLAS SHATTERPROOF CONTAINER

## (undated) DEVICE — Device

## (undated) DEVICE — AIRLIFE™ OXYGEN TUBING 7 FEET (2.1 M) CRUSH RESISTANT OXYGEN TUBING, VINYL TIPPED: Brand: AIRLIFE™

## (undated) DEVICE — DISPOSABLE BIOPSY VALVE MAJ-1555: Brand: SINGLE USE BIOPSY VALVE (STERILE)

## (undated) DEVICE — SYRINGE,TOOMEY,IRRIGATION,70CC,STERILE: Brand: MEDLINE

## (undated) DEVICE — ENDOSCOPIC KIT 1.1+ OP4 CA DE 2 GWN AAMI LEVEL 3

## (undated) DEVICE — SHEET, T, LAPAROTOMY, STERILE: Brand: MEDLINE